# Patient Record
Sex: FEMALE | NOT HISPANIC OR LATINO | Employment: UNEMPLOYED | URBAN - METROPOLITAN AREA
[De-identification: names, ages, dates, MRNs, and addresses within clinical notes are randomized per-mention and may not be internally consistent; named-entity substitution may affect disease eponyms.]

---

## 2017-01-02 DIAGNOSIS — E03.9 HYPOTHYROIDISM, UNSPECIFIED TYPE: ICD-10-CM

## 2017-01-02 DIAGNOSIS — R79.89 LOW TSH LEVEL: ICD-10-CM

## 2017-01-13 DIAGNOSIS — E03.9 HYPOTHYROIDISM, UNSPECIFIED TYPE: ICD-10-CM

## 2017-01-13 DIAGNOSIS — R79.89 LOW TSH LEVEL: ICD-10-CM

## 2017-01-13 LAB
T3 SERPL-MCNC: 127 NG/DL (ref 60–181)
T4 FREE SERPL-MCNC: 0.98 NG/DL (ref 0.76–1.46)
TSH SERPL DL<=0.05 MIU/L-ACNC: <0.01 MU/L (ref 0.4–4)

## 2017-01-13 PROCEDURE — 84480 ASSAY TRIIODOTHYRONINE (T3): CPT

## 2017-01-13 PROCEDURE — 84439 ASSAY OF FREE THYROXINE: CPT

## 2017-01-13 PROCEDURE — 36415 COLL VENOUS BLD VENIPUNCTURE: CPT

## 2017-01-13 PROCEDURE — 84443 ASSAY THYROID STIM HORMONE: CPT

## 2017-01-14 DIAGNOSIS — E05.80 THYROTOXICOSIS, EXOGENOUS IATROGENIC: ICD-10-CM

## 2017-01-14 DIAGNOSIS — E03.9 HYPOTHYROIDISM, UNSPECIFIED TYPE: Primary | ICD-10-CM

## 2017-01-14 RX ORDER — LEVOTHYROXINE SODIUM 88 MCG
88 TABLET ORAL DAILY
Qty: 90 TABLET | Refills: 3 | Status: SHIPPED | OUTPATIENT
Start: 2017-01-14 | End: 2017-03-31

## 2017-01-18 ENCOUNTER — TELEPHONE (OUTPATIENT)
Dept: ENDOCRINOLOGY | Facility: CLINIC | Age: 42
End: 2017-01-18

## 2017-01-18 DIAGNOSIS — E03.9 HYPOTHYROIDISM, UNSPECIFIED TYPE: Primary | ICD-10-CM

## 2017-01-18 NOTE — TELEPHONE ENCOUNTER
----- Message from Amberly Aguilar RN sent at 1/17/2017  7:09 PM CST -----  Regarding: FW: labs to be entered  Contact: [phone]      ----- Message -----     From: Maria Luz Guevara     Sent: 1/17/2017   5:49 PM       To: [facility] Triage-  Subject: labs to be entered                               Pt called to schedule an appointment for 2 months out with Dr. Rodriguez, she was told in the [employer] message that there were going to be follow up labs along with this appointment but no new orders have been entered for them. Can these be entered so she can schedule her labs please?    Thank you     Maria Luz Guevara  Intake representative  Call Center

## 2017-01-24 ENCOUNTER — OFFICE VISIT (OUTPATIENT)
Dept: ORTHOPEDICS | Facility: CLINIC | Age: 42
End: 2017-01-24

## 2017-01-24 VITALS — WEIGHT: 148 LBS | BODY MASS INDEX: 27.23 KG/M2 | HEIGHT: 62 IN

## 2017-01-24 DIAGNOSIS — M25.469 EFFUSION OF LOWER LEG JOINT: Primary | ICD-10-CM

## 2017-01-24 RX ORDER — NAPROXEN 500 MG/1
500 TABLET ORAL 2 TIMES DAILY WITH MEALS
Qty: 60 TABLET | Refills: 3 | Status: SHIPPED | OUTPATIENT
Start: 2017-01-24 | End: 2017-11-20

## 2017-01-24 NOTE — PROGRESS NOTES
" Subjective:   Karishma Saucedo is a 41 year old female who complains of left posterior knee pain. No history of trauma to her left knee. No acute injury. +Theater sign and pain with first few steps after immobility. No swelling, no locking and no giveway. No anterior knee pain. She has not had to take anything for this as the pain has been mild. 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    Background:   Date of injury: None   Duration of symptoms: 3 months  Mechanism of Injury: Insidious Onset; Unknown   Aggravating factors: Standing after sitting for long periods  Relieving Factors: walking  Prior Evaluation: None    PAST MEDICAL, SOCIAL, SURGICAL AND FAMILY HISTORY: She  has a past medical history of Hypothyroidism and Dysmenorrhea.  She  has past surgical history that includes no history of surgery.  Her family history includes DIABETES in her father and mother; Hyperlipidemia in an other family member; Hypertension in her maternal uncle; Thyroid Disease in her father, maternal uncle, and another family member. There is no history of CANCER.  She reports that she has never smoked. She has never used smokeless tobacco. She reports that she drinks about 3.0 oz of alcohol per week. She reports that she does not use illicit drugs.    ALLERGIES: She is allergic to augmentin and shellfish allergy.    CURRENT MEDICATIONS: She has a current medication list which includes the following prescription(s): synthroid, norelgestromin-ethinyl estradiol, letrozole, fexofenadine hcl, vitamin d, and multiple vitamins-minerals.     REVIEW OF SYSTEMS: 3 point review of systems is negative except as noted above.     Exam:   Ht 5' 2\" (1.575 m)  Wt 148 lb (67.132 kg)  BMI 27.06 kg/m2      CONSTITUTIONIAL: healthy, alert and no distress  GAIT: normal  PSYCHIATRIC: affect normal/bright and mentation appears normal.    MUSCULOSKELETAL:   LEFT KNEE: Comprehensive knee examination demonstrates FROM, (+) effusion, " (-) medial/lateral patellar facet tenderness, (-) patellar inhibition, (-) apprehension; (-) pain or laxity with valgus stress testing in 0 or 30 degrees of knee flexion, (-) pain or laxity with varus stress testing in 0 or 30 degrees of knee flexion, (-) lachman, (-) PD; (-) medial joint line tenderness, (-) lateral joint line tenderness, (-) bounce test, (-) Baylee test.         Assessment/Plan:   (M25.805) Effusion of lower leg joint  (primary encounter diagnosis)  Comment: Will proceed with a trial of naproxen given the remaining knee exam is unremarkable, with no trauma, and only a small effusion. If effusion does not resolve will then proceed with an MRI and if that is unremarkable then consider synovial fluid aspiration. Karishma is aware of the process and plan. She will follow up if effusion does not resolve.  Plan: naproxen (NAPROSYN) 500 MG tablet

## 2017-01-24 NOTE — Clinical Note
"  1/24/2017      RE: Karishma Saucedo  [address]  [facility] [address]        Subjective:   Karishma Saucedo is a 41 year old female who complains of left posterior knee pain. No history of trauma to her left knee. No acute injury. +Theater sign and pain with first few steps after immobility. No swelling, no locking and no giveway. No anterior knee pain. She has not had to take anything for this as the pain has been mild. 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    Background:   Date of injury: None   Duration of symptoms: 3 months  Mechanism of Injury: Insidious Onset; Unknown   Aggravating factors: Standing after sitting for long periods  Relieving Factors: walking  Prior Evaluation: None    PAST MEDICAL, SOCIAL, SURGICAL AND FAMILY HISTORY: She  has a past medical history of Hypothyroidism and Dysmenorrhea.  She  has past surgical history that includes no history of surgery.  Her family history includes DIABETES in her father and mother; Hyperlipidemia in an other family member; Hypertension in her maternal uncle; Thyroid Disease in her father, maternal uncle, and another family member. There is no history of CANCER.  She reports that she has never smoked. She has never used smokeless tobacco. She reports that she drinks about 3.0 oz of alcohol per week. She reports that she does not use illicit drugs.    ALLERGIES: She is allergic to augmentin and shellfish allergy.    CURRENT MEDICATIONS: She has a current medication list which includes the following prescription(s): synthroid, norelgestromin-ethinyl estradiol, letrozole, fexofenadine hcl, vitamin d, and multiple vitamins-minerals.     REVIEW OF SYSTEMS: 3 point review of systems is negative except as noted above.     Exam:   Ht 5' 2\" (1.575 m)  Wt 148 lb (67.132 kg)  BMI 27.06 kg/m2      CONSTITUTIONIAL: healthy, alert and no distress  GAIT: normal  PSYCHIATRIC: affect normal/bright and mentation appears " normal.    MUSCULOSKELETAL:   LEFT KNEE: Comprehensive knee examination demonstrates FROM, (+) effusion, (-) medial/lateral patellar facet tenderness, (-) patellar inhibition, (-) apprehension; (-) pain or laxity with valgus stress testing in 0 or 30 degrees of knee flexion, (-) pain or laxity with varus stress testing in 0 or 30 degrees of knee flexion, (-) lachman, (-) PD; (-) medial joint line tenderness, (-) lateral joint line tenderness, (-) bounce test, (-) Baylee test.         Assessment/Plan:   (M25.469) Effusion of lower leg joint  (primary encounter diagnosis)  Comment: Will proceed with a trial of naproxen given the remaining knee exam is unremarkable, with no trauma, and only a small effusion. If effusion does not resolve will then proceed with an MRI and if that is unremarkable then consider synovial fluid aspiration. Karishma is aware of the process and plan. She will follow up if effusion does not resolve.  Plan: naproxen (NAPROSYN) 500 MG tablet           Eliot Cheng MD

## 2017-03-28 DIAGNOSIS — E03.9 HYPOTHYROIDISM, UNSPECIFIED TYPE: ICD-10-CM

## 2017-03-28 LAB
T3 SERPL-MCNC: 132 NG/DL (ref 60–181)
T4 FREE SERPL-MCNC: 0.91 NG/DL (ref 0.76–1.46)
TSH SERPL DL<=0.05 MIU/L-ACNC: <0.01 MU/L (ref 0.4–4)

## 2017-03-31 ENCOUNTER — OFFICE VISIT (OUTPATIENT)
Dept: ENDOCRINOLOGY | Facility: CLINIC | Age: 42
End: 2017-03-31

## 2017-03-31 VITALS
SYSTOLIC BLOOD PRESSURE: 127 MMHG | HEIGHT: 62 IN | DIASTOLIC BLOOD PRESSURE: 85 MMHG | BODY MASS INDEX: 27.71 KG/M2 | HEART RATE: 109 BPM | WEIGHT: 150.6 LBS

## 2017-03-31 DIAGNOSIS — E03.9 HYPOTHYROIDISM, UNSPECIFIED TYPE: ICD-10-CM

## 2017-03-31 DIAGNOSIS — E05.80 THYROTOXICOSIS, EXOGENOUS IATROGENIC: ICD-10-CM

## 2017-03-31 RX ORDER — LEVOTHYROXINE SODIUM 88 MCG
TABLET ORAL
Qty: 90 TABLET | Refills: 3 | Status: SHIPPED | OUTPATIENT
Start: 2017-03-31 | End: 2017-10-22 | Stop reason: DRUGHIGH

## 2017-03-31 RX ORDER — LEVOTHYROXINE SODIUM 88 MCG
TABLET ORAL
Qty: 90 TABLET | Refills: 3 | Status: SHIPPED | OUTPATIENT
Start: 2017-03-31 | End: 2017-03-31

## 2017-03-31 ASSESSMENT — PAIN SCALES - GENERAL: PAINLEVEL: NO PAIN (0)

## 2017-03-31 NOTE — NURSING NOTE
Chief Complaint   Patient presents with     RECHECK     F/U HYPOTHYROID     Princess Hampton, JAMES  Endocrinology & Diabetes 3G

## 2017-03-31 NOTE — PROGRESS NOTES
Endocrinology Consult Note    Attending ASSESSMENT/PLAN:     1.  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 Reduce dose to 88 x  6.5/week, divided.  Labs in 2 months.    2  Hypothyroidism - as per # 1.  I have insufficient data to confirm or refute this past diagnosis - It is possible she never had hypothyroidism.     Lima Rodriguez MD      Cc/  HISTORY OF PRESENT ILLNESS Karishma presents todayfor follow up of hypothyroidism.      She initially presented here on Synthroid 112 mcg/day + cytomel 12.5 mcg bid.  Since our lst meeting we stopped the cytomel.   She was last seen by me 7/15 at which time she was on 112 x 6.5 doses/week, divided.  We reduced the dose to Synthroid 100 mcg/day.  There have been a number of mychart dose changes since then.  ON 10/5/15 we reduced to 100  X 6.5/week, divided.  After the 1/17 labs we reduced to Synthroid 88 mcg/day.  She most recently had labs on 3/28/17, TSH < 0.01, free T4 0.91, T3 132.      REVIEW OF SYSTEMS  Weight gain 15# in the last year  Energy in the morning, tired in the afternoon  Bedtime 8-10 PM   No hot flashes  Cardiac: negative  She believes letrozole is for endometriosis  Her is occasionally having bleeding on OCP patch    Past Medical History  Past Medical History:   Diagnosis Date     Dysmenorrhea      Hypothyroidism      Medications  Current Outpatient Prescriptions   Medication Sig Dispense Refill     SYNTHROID 88 MCG tablet Monday-Saturday: (1 tablet/day);   Sunday: (0.5 tablet) 90 tablet 3     naproxen (NAPROSYN) 500 MG tablet Take 1 tablet (500 mg) by mouth 2 times daily (with meals) 60 tablet 3     norelgestromin-ethinyl estradiol (ORTHO EVRA) 150-35 MCG/24HR patch Place 1 patch onto the skin once a week 12 patch 4     letrozole (FEMARA) 2.5 MG tablet Take 1 tablet (2.5 mg) by mouth daily 90 tablet 3     Fexofenadine HCl (ALLEGRA PO) Take 30 mg by mouth daily       Cholecalciferol (VITAMIN D) 2000 UNITS CAPS Take 2 tablets by mouth daily    "    Multiple Vitamins-Minerals (MULTIVITAL PO) Take by mouth daily       [DISCONTINUED] SYNTHROID 88 MCG tablet Monday-Saturday: (1 tablet/day);   Sunday: (0.5 tablet) 90 tablet 3     [DISCONTINUED] SYNTHROID 88 MCG tablet Take 1 tablet (88 mcg) by mouth daily 90 tablet 3       Allergies  Allergies   Allergen Reactions     Augmentin Nausea and Vomiting     Shellfish Allergy Hives       Family History  family history includes DIABETES in her father and mother; Hyperlipidemia in an other family member; Hypertension in her maternal uncle; Other Cancer in her father; Thyroid Disease in her father, maternal uncle, and another family member. There is no history of CANCER.    Social History  Social History   Substance Use Topics     Smoking status: Never Smoker     Smokeless tobacco: Never Used     Alcohol use 3.0 oz/week      Comment: 1-2 drinks 3 days/week     ;     Physical Exam  /85  Pulse 109  Ht 1.575 m (5' 2\")  Wt 68.3 kg (150 lb 9.6 oz)  BMI 27.55 kg/m2  Body mass index is 27.55 kg/(m^2).  GENERAL : pleasant young woman  In no apparent distress.She is alone  SKIN: Normal color, normal temperature, texture.    EYES: PER, No scleral icterus,  No proptosis, conjunctival redness, stare, retraction  NECK: No visible masses. No palpable adenopathy, or masses.  THYROID:  Not palpable  RESP: Lungs clear to auscultation bilaterally  CARDIAC: Regular rate and rhythm, normal S1 S2, without murmurs, rubs or gallops       NEURO: awake, alert, responds appropriately to questions..  Moves all extremities; Gait normal.  No tremor of the outstretched hand.  DTRs  2 /4 ,   EXTREMITIES: No clubbing, cyanosis or edema.    DATA REVEIW    ENDO THYROID LABS-[facility] Latest Ref Rng & Units 3/28/2017 1/13/2017   TSH 0.40 - 4.00 mU/L <0.01 (L) <0.01 (L)   T4 FREE 0.76 - 1.46 ng/dL 0.91 0.98   TRIIODOTHYRONINE(T3) 60 - 181 ng/dL 132 127     ENDO THYROID LABS-[facility] Latest Ref Rng & Units 12/23/2016 3/4/2016   TSH 0.40 - 4.00 mU/L " <0.01 (L) <0.01 (L)   T4 FREE 0.76 - 1.46 ng/dL 1.04 0.95   TRIIODOTHYRONINE(T3) 60 - 181 ng/dL 139      ENDO THYROID LABS-[facility] Latest Ref Rng & Units 10/2/2015   TSH 0.40 - 4.00 mU/L <0.01 (L)   T4 FREE 0.76 - 1.46 ng/dL 1.01   TRIIODOTHYRONINE(T3) 60 - 181 ng/dL 106

## 2017-03-31 NOTE — PATIENT INSTRUCTIONS
Reduce the Synthroid from 88 mcg/day to the following schedule:  Monday-Saturday: (1 tablet/day);   Sunday: (0.5 tablet)  You should have labs to follow up on the change in about 2 months.  I am leaving orders for follow up labs on the medical record.  Please call [phone]  to schedule lab follow up testing as directed.  Alternatively, it can be drawn in any [place] lab.

## 2017-03-31 NOTE — MR AVS SNAPSHOT
After Visit Summary   3/31/2017    Karishma Saucedo    MRN: [id number]           Patient Information     Date Of Birth          1975        Visit Information        Provider Department      3/31/2017 12:30 PM Lima Rodriguez MD [facility]        Today's Diagnoses     Hypothyroidism, unspecified type        Thyrotoxicosis, exogenous iatrogenic          Care Instructions    Reduce the Synthroid from 88 mcg/day to the following schedule:  Monday-Saturday: (1 tablet/day);   Sunday: (0.5 tablet)  You should have labs to follow up on the change in about 2 months.  I am leaving orders for follow up labs on the medical record.  Please call [phone]  to schedule lab follow up testing as directed.  Alternatively, it can be drawn in any [place] lab.            Follow-ups after your visit        Follow-up notes from your care team     Return in about 6 months (around 9/30/2017).      Future tests that were ordered for you today     Open Future Orders        Priority Expected Expires Ordered    T4 free Routine 5/31/2017 3/31/2018 3/31/2017    T3 total Routine 5/31/2017 3/31/2018 3/31/2017    TSH Routine 5/31/2017 3/31/2018 3/31/2017            Who to contact     Please call your clinic at [phone] to:    Ask questions about your health    Make or cancel appointments    Discuss your medicines    Learn about your test results    Speak to your doctor   If you have compliments or concerns about an experience at your clinic, or if you wish to file a complaint, please contact [facility] Patient Relations at [phone] or email us at [email or web address]         Additional Information About Your Visit        MyChart Information     [employer] gives you secure access to your electronic health record. If you see a primary care provider, you can also send messages to your care team and make appointments. If you have questions, please call your primary care  "clinic.  If you do not have a primary care provider, please call [phone] and they will assist you.      [employer] is an electronic gateway that provides easy, online access to your medical records. With [employer], you can request a clinic appointment, read your test results, renew a prescription or communicate with your care team.     To access your existing account, please contact your [facility] or call [phone] for assistance.        Care EveryWhere ID     This is your Care EveryWhere ID. This could be used by other organizations to access your [place] medical records  [id number]        Your Vitals Were     Pulse Height BMI (Body Mass Index)             109 1.575 m (5' 2\") 27.55 kg/m2          Blood Pressure from Last 3 Encounters:   03/31/17 127/85   05/13/16 119/76   01/27/16 110/78    Weight from Last 3 Encounters:   03/31/17 68.3 kg (150 lb 9.6 oz)   01/24/17 67.1 kg (148 lb)   05/13/16 66.6 kg (146 lb 12.8 oz)                 Today's Medication Changes          These changes are accurate as of: 3/31/17  3:27 PM.  If you have any questions, ask your nurse or doctor.               Start taking these medicines.        Dose/Directions    SYNTHROID 88 MCG tablet   Used for:  Hypothyroidism, unspecified type, Thyrotoxicosis, exogenous iatrogenic   Generic drug:  levothyroxine   Started by:  Lima Rodriguez MD        Monday-Saturday: (1 tablet/day);  Sunday: (0.5 tablet)   Quantity:  90 tablet   Refills:  3            Where to get your medicines      Some of these will need a paper prescription and others can be bought over the counter.  Ask your nurse if you have questions.     Bring a paper prescription for each of these medications     SYNTHROID 88 MCG tablet                Primary Care Provider Office Phone # Fax #    Meche Cast [phone]       [address]  [facility] [address]        Thank you!     Thank you for " choosing [facility] ENDOCRINOLOG[facility]  for your care. Our goal is always to provide you with excellent care. Hearing back from our patients is one way we can continue to improve our services. Please take a few minutes to complete the written survey that you may receive in the mail after your visit with us. Thank you!             Your Updated Medication List - Protect others around you: Learn how to safely use, store and throw away your medicines at www.disposemymeds.org.          This list is accurate as of: 3/31/17  3:27 PM.  Always use your most recent med list.                   Brand Name Dispense Instructions for use    ALLEGRA PO      Take 30 mg by mouth daily       letrozole 2.5 MG tablet    FEMARA    90 tablet    Take 1 tablet (2.5 mg) by mouth daily       MULTIVITAL PO      Take by mouth daily       naproxen 500 MG tablet    NAPROSYN    60 tablet    Take 1 tablet (500 mg) by mouth 2 times daily (with meals)       norelgestromin-ethinyl estradiol 150-35 MCG/24HR patch    ORTHO EVRA    12 patch    Place 1 patch onto the skin once a week       SYNTHROID 88 MCG tablet   Generic drug:  levothyroxine     90 tablet    Monday-Saturday: (1 tablet/day);  Sunday: (0.5 tablet)       vitamin D 2000 UNITS Caps      Take 2 tablets by mouth daily

## 2017-03-31 NOTE — LETTER
3/31/2017       RE: Karishma Saucedo  [address]  [facility] [address]     Dear Colleague,    Thank you for referring your patient, Karishma Saucedo, to the [facility] ENDOCRINOLOGY at [facility]. Please see a copy of my visit note below.         DIABETES AND ENDOCRINOLOGY CLINIC    Karishma Saucedo  41 years/female  MRN: [id number]    Reason for visit: follow up of hypothyroidism and iatrogenic thyrotoxicosis.    HPI:  Karishma Saucedo is a 41 year old female with hypothyroidism with iatrogenic thyrotoxicosis.   At her first visit, she was on Synthroid 112 mcg/day + Cytomel 12.5 mcg BID. Cytomel was discontinued at this visit.   In 6/2014, her TSH was 9.09. She was on L-T4 112mcg 8 doses/week.  In 8/2014, TSH was suppressed but FT4 and T3 was normal. L-T4 was decreased to 112 mcg 7.5doses/week.  In 10/2014, L-T4 was further decreased to 112mcg/day, as her TSH was <0.01, FT4 - 0.88, T3-85.   1/2015 - TSH < 0.01, FT4 - 1.09. L-T4 was changed to 112mcg 6.5doses/day.   3/2015: TSH <0.01, FT4- 0.97  7/2015: FT4- 1, TSH <0.01, T3 -109.  She was changed to synthroid 100mcg/day.   Since then she has been on follow up through [facility] and is on Synthroid 88mcg/day since January.   She complains of a 15 pound weight gain over the past year. She has occasional tremors which she says are familiar. She attributes her dry skin to the weather in [place]. She complains of occasional edema on prolonged standing or long flights.        Past Medical History:   Diagnosis Date     Dysmenorrhea      Hypothyroidism      Past Surgical History:   Procedure Laterality Date     NO HISTORY OF SURGERY       Family History   Problem Relation Age of Onset     Thyroid Disease Maternal Uncle      DIABETES Mother      Hypertension Maternal Uncle      CANCER No family hx of      DIABETES Father      Thyroid Disease       Hyperlipidemia       Thyroid Disease Father      Social history:   She drinks  "around 2 glasses of wine/ week. She has never been a smoker.      Current Outpatient Prescriptions   Medication     naproxen (NAPROSYN) 500 MG tablet     SYNTHROID 88 MCG tablet     norelgestromin-ethinyl estradiol (ORTHO EVRA) 150-35 MCG/24HR patch     letrozole (FEMARA) 2.5 MG tablet     Fexofenadine HCl (ALLEGRA PO)     Cholecalciferol (VITAMIN D) 2000 UNITS CAPS     Multiple Vitamins-Minerals (MULTIVITAL PO)     No current facility-administered medications for this visit.          Physical examination:   Vitals: /85  Pulse 109  Ht 1.575 m (5' 2\")  Wt 68.3 kg (150 lb 9.6 oz)  BMI 27.55 kg/m2  General: she appears healthy and comfortable  HEENT: No thyroid eye signs. Thyroid gland was not palpable.   Cardiac: S1S2+. No murmur/added sounds.  Respiratory: normal breath sounds in all lung fields  Abdomen: soft and non tender. No organomegaly.  Extremities: no tremor on examination. No edema. Reflexes normal.      Orders Only on 03/28/2017   Component Date Value Ref Range Status     T4 Free 03/28/2017 0.91  0.76 - 1.46 ng/dL Final     Triiodothyronine (T3) 03/28/2017 132  60 - 181 ng/dL Final     TSH 03/28/2017 <0.01* 0.40 - 4.00 mU/L Final         Assessment and Plan:     1. Hypothyroidism and iatrogenic thyrotoxicosis:   Karishma Saucedo is a 41 year old female with hypothyroidism with iatrogenic thyrotoxicosis. Since her first visit, her labs have shown iatrogenic thyrotoxicosis. She is asymptomatic. She doesn't feel different with medication changes.   Her most recent TFTs show hyperthyroidism, and she is on Synthroid 88mcg/day.      Recommendations:   - Snythroid to be reduced to 88mcg 6.5doses/week  - repeat TFT in 2 months.  - communicate with us through [employer] if there are any changes.           Annette Samayoa  Medical student.    Endocrinology Consult Note    Attending ASSESSMENT/PLAN:     1.  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 Reduce dose to 88 x  6.5/week, divided.  Labs in 2 " months.    2  Hypothyroidism - as per # 1.  I have insufficient data to confirm or refute this past diagnosis - It is possible she never had hypothyroidism.     Lima Rodriguez MD      Cc/  HISTORY OF PRESENT ILLNESS Karishma presents todayfor follow up of hypothyroidism.      She initially presented here on Synthroid 112 mcg/day + cytomel 12.5 mcg bid.  Since our lst meeting we stopped the cytomel.   She was last seen by me 7/15 at which time she was on 112 x 6.5 doses/week, divided.  We reduced the dose to Synthroid 100 mcg/day.  There have been a number of mychart dose changes since then.  ON 10/5/15 we reduced to 100  X 6.5/week, divided.  After the 1/17 labs we reduced to Synthroid 88 mcg/day.  She most recently had labs on 3/28/17, TSH < 0.01, free T4 0.91, T3 132.      REVIEW OF SYSTEMS  Weight gain 15# in the last year  Energy in the morning, tired in the afternoon  Bedtime 8-10 PM   No hot flashes  Cardiac: negative  She believes letrozole is for endometriosis  Her is occasionally having bleeding on OCP patch    Past Medical History  Past Medical History:   Diagnosis Date     Dysmenorrhea      Hypothyroidism      Medications  Current Outpatient Prescriptions   Medication Sig Dispense Refill     SYNTHROID 88 MCG tablet Monday-Saturday: (1 tablet/day);   Sunday: (0.5 tablet) 90 tablet 3     naproxen (NAPROSYN) 500 MG tablet Take 1 tablet (500 mg) by mouth 2 times daily (with meals) 60 tablet 3     norelgestromin-ethinyl estradiol (ORTHO EVRA) 150-35 MCG/24HR patch Place 1 patch onto the skin once a week 12 patch 4     letrozole (FEMARA) 2.5 MG tablet Take 1 tablet (2.5 mg) by mouth daily 90 tablet 3     Fexofenadine HCl (ALLEGRA PO) Take 30 mg by mouth daily       Cholecalciferol (VITAMIN D) 2000 UNITS CAPS Take 2 tablets by mouth daily       Multiple Vitamins-Minerals (MULTIVITAL PO) Take by mouth daily       [DISCONTINUED] SYNTHROID 88 MCG tablet Monday-Saturday: (1 tablet/day);   Sunday: (0.5 tablet) 90  "tablet 3     [DISCONTINUED] SYNTHROID 88 MCG tablet Take 1 tablet (88 mcg) by mouth daily 90 tablet 3       Allergies  Allergies   Allergen Reactions     Augmentin Nausea and Vomiting     Shellfish Allergy Hives       Family History  family history includes DIABETES in her father and mother; Hyperlipidemia in an other family member; Hypertension in her maternal uncle; Other Cancer in her father; Thyroid Disease in her father, maternal uncle, and another family member. There is no history of CANCER.    Social History  Social History   Substance Use Topics     Smoking status: Never Smoker     Smokeless tobacco: Never Used     Alcohol use 3.0 oz/week      Comment: 1-2 drinks 3 days/week     ;     Physical Exam  /85  Pulse 109  Ht 1.575 m (5' 2\")  Wt 68.3 kg (150 lb 9.6 oz)  BMI 27.55 kg/m2  Body mass index is 27.55 kg/(m^2).  GENERAL : pleasant young woman  In no apparent distress.She is alone  SKIN: Normal color, normal temperature, texture.    EYES: PER, No scleral icterus,  No proptosis, conjunctival redness, stare, retraction  NECK: No visible masses. No palpable adenopathy, or masses.  THYROID:  Not palpable  RESP: Lungs clear to auscultation bilaterally  CARDIAC: Regular rate and rhythm, normal S1 S2, without murmurs, rubs or gallops       NEURO: awake, alert, responds appropriately to questions..  Moves all extremities; Gait normal.  No tremor of the outstretched hand.  DTRs  2 /4 ,   EXTREMITIES: No clubbing, cyanosis or edema.    DATA REVEIW    ENDO THYROID LABS-[facility] Latest Ref Rng & Units 3/28/2017 1/13/2017   TSH 0.40 - 4.00 mU/L <0.01 (L) <0.01 (L)   T4 FREE 0.76 - 1.46 ng/dL 0.91 0.98   TRIIODOTHYRONINE(T3) 60 - 181 ng/dL 132 127     ENDO THYROID LABS-[facility] Latest Ref Rng & Units 12/23/2016 3/4/2016   TSH 0.40 - 4.00 mU/L <0.01 (L) <0.01 (L)   T4 FREE 0.76 - 1.46 ng/dL 1.04 0.95   TRIIODOTHYRONINE(T3) 60 - 181 ng/dL 139      ENDO THYROID LABS-[facility] Latest Ref Rng & Units 10/2/2015   TSH 0.40 - " 4.00 mU/L <0.01 (L)   T4 FREE 0.76 - 1.46 ng/dL 1.01   TRIIODOTHYRONINE(T3) 60 - 181 ng/dL 106       Again, thank you for allowing me to participate in the care of your patient.      Sincerely,    Lima Rodriguez MD

## 2017-03-31 NOTE — PROGRESS NOTES
DIABETES AND ENDOCRINOLOGY CLINIC    Karishma Saucedo  41 years/female  MRN: [id number]    Reason for visit: follow up of hypothyroidism and iatrogenic thyrotoxicosis.    HPI:  Karishma Sauceod is a 41 year old female with hypothyroidism with iatrogenic thyrotoxicosis.   At her first visit, she was on Synthroid 112 mcg/day + Cytomel 12.5 mcg BID. Cytomel was discontinued at this visit.   In 6/2014, her TSH was 9.09. She was on L-T4 112mcg 8 doses/week.  In 8/2014, TSH was suppressed but FT4 and T3 was normal. L-T4 was decreased to 112 mcg 7.5doses/week.  In 10/2014, L-T4 was further decreased to 112mcg/day, as her TSH was <0.01, FT4 - 0.88, T3-85.   1/2015 - TSH < 0.01, FT4 - 1.09. L-T4 was changed to 112mcg 6.5doses/day.   3/2015: TSH <0.01, FT4- 0.97  7/2015: FT4- 1, TSH <0.01, T3 -109.  She was changed to synthroid 100mcg/day.   Since then she has been on follow up through [facility] and is on Synthroid 88mcg/day since January.   She complains of a 15 pound weight gain over the past year. She has occasional tremors which she says are familiar. She attributes her dry skin to the weather in [place]. She complains of occasional edema on prolonged standing or long flights.        Past Medical History:   Diagnosis Date     Dysmenorrhea      Hypothyroidism      Past Surgical History:   Procedure Laterality Date     NO HISTORY OF SURGERY       Family History   Problem Relation Age of Onset     Thyroid Disease Maternal Uncle      DIABETES Mother      Hypertension Maternal Uncle      CANCER No family hx of      DIABETES Father      Thyroid Disease       Hyperlipidemia       Thyroid Disease Father      Social history:   She drinks around 2 glasses of wine/ week. She has never been a smoker.      Current Outpatient Prescriptions   Medication     naproxen (NAPROSYN) 500 MG tablet     SYNTHROID 88 MCG tablet     norelgestromin-ethinyl estradiol (ORTHO EVRA) 150-35 MCG/24HR patch     letrozole (FEMARA) 2.5 MG tablet      "Fexofenadine HCl (ALLEGRA PO)     Cholecalciferol (VITAMIN D) 2000 UNITS CAPS     Multiple Vitamins-Minerals (MULTIVITAL PO)     No current facility-administered medications for this visit.          Physical examination:   Vitals: /85  Pulse 109  Ht 1.575 m (5' 2\")  Wt 68.3 kg (150 lb 9.6 oz)  BMI 27.55 kg/m2  General: she appears healthy and comfortable  HEENT: No thyroid eye signs. Thyroid gland was not palpable.   Cardiac: S1S2+. No murmur/added sounds.  Respiratory: normal breath sounds in all lung fields  Abdomen: soft and non tender. No organomegaly.  Extremities: no tremor on examination. No edema. Reflexes normal.      Orders Only on 03/28/2017   Component Date Value Ref Range Status     T4 Free 03/28/2017 0.91  0.76 - 1.46 ng/dL Final     Triiodothyronine (T3) 03/28/2017 132  60 - 181 ng/dL Final     TSH 03/28/2017 <0.01* 0.40 - 4.00 mU/L Final         Assessment and Plan:     1. Hypothyroidism and iatrogenic thyrotoxicosis:   Karishma Saucedo is a 41 year old female with hypothyroidism with iatrogenic thyrotoxicosis. Since her first visit, her labs have shown iatrogenic thyrotoxicosis. She is asymptomatic. She doesn't feel different with medication changes.   Her most recent TFTs show hyperthyroidism, and she is on Synthroid 88mcg/day.      Recommendations:   - Snythroid to be reduced to 88mcg 6.5doses/week  - repeat TFT in 2 months.  - communicate with us through [employer] if there are any changes.           Annette Samayoa  Medical student.  "

## 2017-04-07 ENCOUNTER — TELEPHONE (OUTPATIENT)
Dept: OBGYN | Facility: CLINIC | Age: 42
End: 2017-04-07

## 2017-04-07 ENCOUNTER — MYC MEDICAL ADVICE (OUTPATIENT)
Dept: OBGYN | Facility: CLINIC | Age: 42
End: 2017-04-07

## 2017-04-07 DIAGNOSIS — N80.9 ENDOMETRIOSIS: ICD-10-CM

## 2017-04-07 RX ORDER — NORELGESTROMIN AND ETHINYL ESTRADIOL 35; 150 UG/MG; UG/MG
1 PATCH TRANSDERMAL WEEKLY
Qty: 12 PATCH | Refills: 0 | Status: SHIPPED | OUTPATIENT
Start: 2017-04-07 | End: 2017-08-08

## 2017-04-07 RX ORDER — LETROZOLE 2.5 MG/1
2.5 TABLET, FILM COATED ORAL DAILY
Qty: 90 TABLET | Refills: 0 | Status: SHIPPED | OUTPATIENT
Start: 2017-04-07 | End: 2017-04-07

## 2017-04-07 RX ORDER — LETROZOLE 2.5 MG/1
2.5 TABLET, FILM COATED ORAL DAILY
Qty: 90 TABLET | Refills: 0 | Status: SHIPPED | OUTPATIENT
Start: 2017-04-07 | End: 2017-07-12

## 2017-04-07 NOTE — TELEPHONE ENCOUNTER
Refill request received for OCP . Her last clinic appointment was 5/13/16. Refill completed per Mandy RN protocol note place on refill that an appointment was needed for further refills.

## 2017-04-07 NOTE — TELEPHONE ENCOUNTER
Received call from Moe, at [facility], stating that pt was there to pick-up refill of OCP patch but she would like pills instead.    Review of EPIC shows OCP pills were not effective in the past.     Spoke with Karishma who clarified that she needs the letrozole pill that she takes along with the patch. Nurse appreciated clarification and sent refill to cover until next annual exam due in May. Also adivsed of need for annual exam and she reports her father is very ill and she will schedule appointment when she knows more of his status. Nurse agreed with plan.

## 2017-07-06 ENCOUNTER — MYC MEDICAL ADVICE (OUTPATIENT)
Dept: ENDOCRINOLOGY | Facility: CLINIC | Age: 42
End: 2017-07-06

## 2017-07-06 DIAGNOSIS — E05.80 THYROTOXICOSIS, EXOGENOUS IATROGENIC: ICD-10-CM

## 2017-07-06 DIAGNOSIS — E03.9 HYPOTHYROIDISM, UNSPECIFIED TYPE: ICD-10-CM

## 2017-07-06 LAB
T3 SERPL-MCNC: 115 NG/DL (ref 60–181)
T4 FREE SERPL-MCNC: 0.94 NG/DL (ref 0.76–1.46)
TSH SERPL DL<=0.05 MIU/L-ACNC: <0.01 MU/L (ref 0.4–4)

## 2017-07-12 ENCOUNTER — MYC MEDICAL ADVICE (OUTPATIENT)
Dept: OBGYN | Facility: CLINIC | Age: 42
End: 2017-07-12

## 2017-07-12 DIAGNOSIS — N80.9 ENDOMETRIOSIS: ICD-10-CM

## 2017-07-12 RX ORDER — LETROZOLE 2.5 MG/1
2.5 TABLET, FILM COATED ORAL DAILY
Qty: 90 TABLET | Refills: 3 | Status: SHIPPED | OUTPATIENT
Start: 2017-07-12 | End: 2017-10-20

## 2017-08-08 ENCOUNTER — TELEPHONE (OUTPATIENT)
Dept: OBGYN | Facility: CLINIC | Age: 42
End: 2017-08-08

## 2017-08-08 DIAGNOSIS — N80.9 ENDOMETRIOSIS: ICD-10-CM

## 2017-08-08 RX ORDER — NORELGESTROMIN AND ETHINYL ESTRADIOL 35; 150 UG/MG; UG/MG
1 PATCH TRANSDERMAL WEEKLY
Qty: 12 PATCH | Refills: 0 | Status: SHIPPED | OUTPATIENT
Start: 2017-08-08 | End: 2017-10-20

## 2017-08-08 NOTE — TELEPHONE ENCOUNTER
"----- Message from Heena Presley sent at 8/8/2017  8:55 AM CDT -----  Regarding: Xulane Rx  Contact: [phone]  Patient has one week left of her medication, Xulane patch. She called & scheduled her annual for 10/20/17 with Dr. Pearson. She is looking for a \"bridge\" of the medication to get her through until she sees Dr. Pearson.     Thank you!  Jemma    Call Center       Please DO NOT send this message and/or reply back to sender. Call Center Representatives DO NOT respond to messages.     "

## 2017-10-09 DIAGNOSIS — E05.80 THYROTOXICOSIS, EXOGENOUS IATROGENIC: Primary | ICD-10-CM

## 2017-10-18 ASSESSMENT — ANXIETY QUESTIONNAIRES
4. TROUBLE RELAXING: NOT AT ALL
GAD7 TOTAL SCORE: 0
7. FEELING AFRAID AS IF SOMETHING AWFUL MIGHT HAPPEN: NOT AT ALL
2. NOT BEING ABLE TO STOP OR CONTROL WORRYING: NOT AT ALL
5. BEING SO RESTLESS THAT IT IS HARD TO SIT STILL: NOT AT ALL
GAD7 TOTAL SCORE: 0
6. BECOMING EASILY ANNOYED OR IRRITABLE: NOT AT ALL
3. WORRYING TOO MUCH ABOUT DIFFERENT THINGS: NOT AT ALL
GAD7 TOTAL SCORE: 0
7. FEELING AFRAID AS IF SOMETHING AWFUL MIGHT HAPPEN: NOT AT ALL
1. FEELING NERVOUS, ANXIOUS, OR ON EDGE: NOT AT ALL

## 2017-10-18 ASSESSMENT — ENCOUNTER SYMPTOMS: BREAST PAIN: 1

## 2017-10-19 ASSESSMENT — ANXIETY QUESTIONNAIRES: GAD7 TOTAL SCORE: 0

## 2017-10-20 ENCOUNTER — OFFICE VISIT (OUTPATIENT)
Dept: OBGYN | Facility: CLINIC | Age: 42
End: 2017-10-20
Attending: OBSTETRICS & GYNECOLOGY
Payer: COMMERCIAL

## 2017-10-20 VITALS
SYSTOLIC BLOOD PRESSURE: 113 MMHG | HEIGHT: 62 IN | DIASTOLIC BLOOD PRESSURE: 70 MMHG | BODY MASS INDEX: 28.52 KG/M2 | HEART RATE: 96 BPM | WEIGHT: 155 LBS

## 2017-10-20 DIAGNOSIS — E05.80 THYROTOXICOSIS, EXOGENOUS IATROGENIC: ICD-10-CM

## 2017-10-20 DIAGNOSIS — Z20.2 STD EXPOSURE: ICD-10-CM

## 2017-10-20 DIAGNOSIS — Z00.00 VISIT FOR PREVENTIVE HEALTH EXAMINATION: ICD-10-CM

## 2017-10-20 DIAGNOSIS — Z12.31 ENCOUNTER FOR SCREENING MAMMOGRAM FOR BREAST CANCER: ICD-10-CM

## 2017-10-20 DIAGNOSIS — N80.9 ENDOMETRIOSIS: ICD-10-CM

## 2017-10-20 DIAGNOSIS — Z83.3 FAMILY HISTORY OF DIABETES MELLITUS: Primary | ICD-10-CM

## 2017-10-20 LAB
T3 SERPL-MCNC: 98 NG/DL (ref 60–181)
T4 FREE SERPL-MCNC: 0.92 NG/DL (ref 0.76–1.46)
TSH SERPL DL<=0.005 MIU/L-ACNC: <0.01 MU/L (ref 0.4–4)

## 2017-10-20 PROCEDURE — 84480 ASSAY TRIIODOTHYRONINE (T3): CPT

## 2017-10-20 PROCEDURE — 87591 N.GONORRHOEAE DNA AMP PROB: CPT | Performed by: OBSTETRICS & GYNECOLOGY

## 2017-10-20 PROCEDURE — 36415 COLL VENOUS BLD VENIPUNCTURE: CPT

## 2017-10-20 PROCEDURE — 99212 OFFICE O/P EST SF 10 MIN: CPT | Mod: ZF

## 2017-10-20 PROCEDURE — 84439 ASSAY OF FREE THYROXINE: CPT

## 2017-10-20 PROCEDURE — 87491 CHLMYD TRACH DNA AMP PROBE: CPT | Performed by: OBSTETRICS & GYNECOLOGY

## 2017-10-20 PROCEDURE — 84443 ASSAY THYROID STIM HORMONE: CPT

## 2017-10-20 RX ORDER — LETROZOLE 2.5 MG/1
2.5 TABLET, FILM COATED ORAL DAILY
Qty: 90 TABLET | Refills: 3 | Status: SHIPPED | OUTPATIENT
Start: 2017-10-20 | End: 2019-01-08

## 2017-10-20 RX ORDER — NORELGESTROMIN AND ETHINYL ESTRADIOL 35; 150 UG/MG; UG/MG
1 PATCH TRANSDERMAL WEEKLY
Qty: 12 PATCH | Refills: 0 | Status: SHIPPED | OUTPATIENT
Start: 2017-10-20 | End: 2018-01-10

## 2017-10-20 NOTE — MR AVS SNAPSHOT
After Visit Summary   10/20/2017    Karishma Saucedo    MRN: [id number]           Patient Information     Date Of Birth          1975        Visit Information        Provider Department      10/20/2017 9:00 AM Hoda Pearson MD [facility]        Today's Diagnoses     Family history of diabetes mellitus    -  1    STD exposure        Encounter for screening mammogram for breast cancer        Endometriosis        Visit for preventive health examination          Care Instructions      PREVENTIVE HEALTH RECOMMENDATIONS:   Most women need a yearly breast and pelvic exam.    A PAP screen, a test done DURING a pelvic exam, is NO longer recommended yearly.    March 2013, screening guidelines recommended by ACOG for PAP screen are:    1) First pap at age 21.    2) Pap every 3 years until age 30.    3) After age 30, pap every 3 years or Pap with HR HPV screen every 5 years until age 65.  4) Women do NOT need a vaginal Pap screen after a hysterectomy (surgical removal of the uterus) when they have not had cancer.    Exceptions:  1) Yearly pap if HIV+ or immunosuppressed secondary to organ transplant  2) ISABEL II-III continue routine screening for 20 years.    I encourage you continue looking for opportunities to choose a healthy lifestyle:       * Choose to eat a heart healthy diet. Check out the FOOD PLATE guidelines at: http://www.choosemyplate.gov/ for helpful hints on weight and cholesterol management.  Balance your caloric intake with exercise to maintain a BMI in the 22 to 26 range. For bone health: Eat calcium-rich foods like yogurt, broccoli or take chewable calcium pills (500 to 600 mg) twice a day with food.       * Exercise for at least an average of 30 minutes a day, 5 days of the week. This will help you control your weight, release stress, and help prevent disease.      * Take a Vitamin D3 supplement daily fall through spring and during summer unless you [id number]' full  body sun exposure to skin without sunscreen.      * DO wear sunscreen to prevent skin cancer after the first 15-30 minutes.      * Identify stressors in your life, find ways to release the stress, and, make time for yourself. PLEASE ask for help if mood changes last longer than two weeks.     * Limit alcohol to one drink per day.  No smoking.  Avoid second hand smoke. If you smoke, ask for help to stop.       *  If you are in a sexual relationship, talk with your partner about possible infection risks and take action to protect yourself from exposure to a sexual infection.    Please request an infection screen for STIs (sexually transmitted infections) if you are less than age 26 OR believe that you may be at risk.     Get a flu shot each year. Get a tetanus shot every 10 years. EVERYONE needs a pertussis (Whooping cough) booster.    See your dentist twice a year for an exam and preventive care cleaning.     Consider the following screen tests:    1) cholesterol test every 5 years.     2) yearly mammogram after age 40 unless you have identified risks.    3) colonoscopy every 10 years after age 50 unless you have identified risks.    4) diabetes blood test screening if you are at risk for diabetes.      Additional information that you may also find helpful:  The Internet now gives us access to LOTS of information -- some of it helpful, research documented and also plenty of harmful, anecdotal information that may not pertain to your situtaion. Consider visiting the following websites for accurate health information:    www.vitamindcouncil.org/ : Info and ongoing research re Vitamin D    www.fairview.org : Up to date and easily searchable information on multiple topics.    www.medlineplus.gov : medication info, interactive tutorials, watch real surgeries online    www.cdc.gov : public health info, travel advisories, epidemics (H1N1)    www.danie/std.org: current research re diagnosis, treatment and prevention of  sexually contacted infections.    www.health.state.[place] : [place] dept of heat, public health issues in [place], N1N1    www.familydoctor.org : good info from the Academy of Family Physicians                Follow-ups after your visit        Follow-up notes from your care team     Return in 1 year (on 10/20/2018) for Preventative Health Visit.      Your next 10 appointments already scheduled     Oct 24, 2017  3:30 PM CDT   MA SCREENING DIGITAL BILATERAL with [id number]   [facility] ([facility])    [address] [place] [address], [address]  [facility] [address] [phone]           Do not use any powder, lotion or deodorant under your arms or on your breast. If you do, we will ask you to remove it before your exam.  Wear comfortable, two-piece clothing.  If you have any allergies, tell your care team.  Bring any previous mammograms from other facilities or have them mailed to the breast center.            Nov 20, 2017  7:30 AM CST   (Arrive by 7:15 AM)   PHYSICAL with Meche Cast MD   [facility] ([facility])    [address]  4th Floor  [facility] [address] [phone]              Future tests that were ordered for you today     Open Future Orders        Priority Expected Expires Ordered    Mammo Screening digital (bilateral) Routine  10/20/2018 10/20/2017    Lipid panel Routine  10/20/2018 10/20/2017    Glucose Routine  10/20/2018 10/20/2017    Hemoglobin A1c Routine  10/20/2018 10/20/2017    25- OH-Vitamin D Routine  10/20/2018 10/20/2017            Who to contact     Please call your clinic at [phone] to:    Ask questions about your health    Make or cancel appointments    Discuss your medicines    Learn about your test results    Speak to your doctor   If you have compliments or concerns about an experience at your clinic, or if you wish to file a complaint, please contact [facility] Patient Relations at  "[phone] or email us at [email or web address]         Additional Information About Your Visit        [facility] Information     [facility] gives you secure access to your electronic health record. If you see a primary care provider, you can also send messages to your care team and make appointments. If you have questions, please call your primary care clinic.  If you do not have a primary care provider, please call [phone] and they will assist you.      [facility] is an electronic gateway that provides easy, online access to your medical records. With [facility], you can request a clinic appointment, read your test results, renew a prescription or communicate with your care team.     To access your existing account, please contact your [facility] or call [phone] for assistance.        Care EveryWhere ID     This is your Care EveryWhere ID. This could be used by other organizations to access your [place] medical records  [id number]        Your Vitals Were     Pulse Height Last Period BMI (Body Mass Index)          96 1.575 m (5' 2\") 09/14/2017 (Approximate) 28.35 kg/m2         Blood Pressure from Last 3 Encounters:   10/20/17 113/70   03/31/17 127/85   05/13/16 119/76    Weight from Last 3 Encounters:   10/20/17 70.3 kg (155 lb)   03/31/17 68.3 kg (150 lb 9.6 oz)   01/24/17 67.1 kg (148 lb)              We Performed the Following     Chlamydia PCR (Clinic Collect)     Gonorrhea PCR          Today's Medication Changes          These changes are accurate as of: 10/20/17  6:10 PM.  If you have any questions, ask your nurse or doctor.               These medicines have changed or have updated prescriptions.        Dose/Directions    norelgestromin-ethinyl estradiol 150-35 MCG/24HR patch   Commonly known as:  ORTHO EVRA   This may have changed:  additional instructions   Used for:  Endometriosis   Changed by:  Hoda Pearson MD        Dose:  1 patch   Place 1 patch " onto the skin once a week Continuous use, no weeks off   Quantity:  12 patch   Refills:  0            Where to get your medicines      These medications were sent to [facility] [place], [place] - [address]  [address], [facility] [address]     Phone:  [phone]     letrozole 2.5 MG tablet    norelgestromin-ethinyl estradiol 150-35 MCG/24HR patch                Primary Care Provider Office Phone # Fax #    Meche Cast [phone]       [address] [place] [address]  [facility] [address]        Equal Access to Services     [facility]: Hadii aad ku hadasho Sojuali, waaxda luqadaha, qaybta kaalmada adeegyada, rosina malagon . So [facility] [phone].    ATENCIÓN: Si habla español, tiene a fox disposición servicios gratuitos de asistencia lingüística. Llame al [phone].    We comply with applicable federal civil rights laws and [place] laws. We do not discriminate on the basis of race, color, national origin, age, disability, sex, sexual orientation, or gender identity.            Thank you!     Thank you for choosing [facility]  for your care. Our goal is always to provide you with excellent care. Hearing back from our patients is one way we can continue to improve our services. Please take a few minutes to complete the written survey that you may receive in the mail after your visit with us. Thank you!             Your Updated Medication List - Protect others around you: Learn how to safely use, store and throw away your medicines at www.disposemymeds.org.          This list is accurate as of: 10/20/17  6:10 PM.  Always use your most recent med list.                   Brand Name Dispense Instructions for use Diagnosis    ALLEGRA PO      Take 30 mg by mouth daily    Thyrotoxicosis, exogenous iatrogenic, Hypothyroidism       letrozole 2.5 MG tablet    FEMARA    90 tablet    Take 1 tablet (2.5 mg) by mouth daily     Endometriosis       MULTIVITAL PO      Take by mouth daily        naproxen 500 MG tablet    NAPROSYN    60 tablet    Take 1 tablet (500 mg) by mouth 2 times daily (with meals)    Effusion of lower leg joint       norelgestromin-ethinyl estradiol 150-35 MCG/24HR patch    ORTHO EVRA    12 patch    Place 1 patch onto the skin once a week Continuous use, no weeks off    Endometriosis       SYNTHROID 88 MCG tablet   Generic drug:  levothyroxine     90 tablet    Monday-Saturday: (1 tablet/day);  Sunday: (0.5 tablet)    Hypothyroidism, unspecified type, Thyrotoxicosis, exogenous iatrogenic       vitamin D 2000 UNITS Caps      Take 2 tablets by mouth daily

## 2017-10-20 NOTE — LETTER
10/20/2017       RE: Karishma Saucedo  [address]  [facility] [address]     Dear Colleague,    Thank you for referring your patient, Karishma Saucedo, to the [facility] at [facility]. Please see a copy of my visit note below.        Progress Note    SUBJECTIVE:  Karishma Saucedo is an 42 year old, , who requests an Annual Preventive Exam.       Concerns today include: busy year, father dx with glioblastoma and [place]ice [place]. Endometriosis well controlled with OCP and letrozole.     Menstrual History:  Menstrual History 2016 2016 10/20/2017   LAST MENSTRUAL PERIOD 2015       Last    Lab Results   Component Value Date    PAP NIL 2014     History of abnormal Pap smear: NO - age 30-65 PAP every 5 years with negative HPV co-testing recommended        Mammogram current: yes  Last Mammogram:   No results found.     Last Colonoscopy:  No results found for this or any previous visit.      HISTORY:    Current Outpatient Prescriptions on File Prior to Visit:  SYNTHROID 88 MCG tablet Monday-Saturday: (1 tablet/day); : (0.5 tablet)   naproxen (NAPROSYN) 500 MG tablet Take 1 tablet (500 mg) by mouth 2 times daily (with meals)   Fexofenadine HCl (ALLEGRA PO) Take 30 mg by mouth daily   Cholecalciferol (VITAMIN D) 2000 UNITS CAPS Take 2 tablets by mouth daily   Multiple Vitamins-Minerals (MULTIVITAL PO) Take by mouth daily   [DISCONTINUED] norelgestromin-ethinyl estradiol (ORTHO EVRA) 150-35 MCG/24HR patch Place 1 patch onto the skin once a week   [DISCONTINUED] letrozole (FEMARA) 2.5 MG tablet Take 1 tablet (2.5 mg) by mouth daily     No current facility-administered medications on file prior to visit.   Allergies   Allergen Reactions     Augmentin Nausea and Vomiting     Shellfish Allergy Hives     Immunization History   Administered Date(s) Administered     Cholera, unspecified formulation  2006, 2007, 2008, 2010, 2012, 2012     HEPA 2006     HepB 2006     Influenza (IIV3) 2014     Influenza Vaccine IM 3yrs+ 4 Valent IIV4 2014     MMR 2006     TD (ADULT, 7+) 2006     Typhoid IM 2006     Typhoid Oral 2012, 2012       Obstetric History       T0      L0     SAB0   TAB0   Ectopic0   Multiple0   Live Births0    Obstetric Comments   LMP 2014     Past Medical History:   Diagnosis Date     Dysmenorrhea      Hypothyroidism      Past Surgical History:   Procedure Laterality Date     NO HISTORY OF SURGERY       Family History   Problem Relation Age of Onset     Thyroid Disease Maternal Uncle      DIABETES Mother      Hypertension Maternal Uncle      DIABETES Father      Thyroid Disease Father      Other Cancer Father      glioblastoma     Parkinsonism Father      Thyroid Disease Other      Hyperlipidemia Other      CANCER No family hx of      Social History     Social History     Marital status:      Spouse name: N/A     Number of children: N/A     Years of education: N/A     Social History Main Topics     Smoking status: Never Smoker     Smokeless tobacco: Never Used     Alcohol use 3.0 oz/week      Comment: 1-2 drinks 3 days/week     Drug use: No     Sexual activity: Yes     Partners: Male     Birth control/ protection: Pill, Patch      Comment: Nuvaring in the past     Other Topics Concern     Exercise Yes     cardio/swim/weights     Social History Narrative     working for University    Moved from [place]    She is a nurse by training    , no kids         (1 miscarriage)    No STIs    Menses regular    Has nuvaring    Pap--none abnormal    PAP      NIL   2014               ROS  [unfilled]  No flowsheet data found.  MICHELLE-7 SCORE 2016 10/18/2017   Total Score 0 (minimal anxiety) 0 (minimal anxiety)   Total Score - 0         EXAM:  Blood pressure 113/70, pulse 96, height  "1.575 m (5' 2\"), weight 70.3 kg (155 lb), last menstrual period 09/14/2017, not currently breastfeeding. Body mass index is 28.35 kg/(m^2).  General - pleasant female in no acute distress.  Skin - no suspicious lesions or rashes  EENT-  PERRLA, euthyroid with out palpable nodules  Neck - supple without lymphadenopathy.  Lungs - clear to auscultation bilaterally.  Heart - regular rate and rhythm without murmur.  Abdomen - soft, nontender, nondistended, no masses or organomegaly noted.  Musculoskeletal - no gross deformities.  Neurological - normal strength, sensation, and mental status.    Breast Exam:  Breast: Without visible skin changes. No dimpling or lesions seen.   Breasts supple, non-tender with palpation, no dominant mass, nodularity, or nipple discharge noted bilaterally. Axillary nodes negative.      Pelvic Exam:  EG/BUS: Normal genital architecture without lesions, erythema or abnormal secretions Bartholin's, Urethra, [place]'s normal   Urethral meatus: normal   Urethra: no masses, tenderness, or scarring   Bladder: no masses or tenderness   Vagina: moist, pink, rugae with creamy, white and odorless  secretions  Cervix: Nulliparous,, no lesions and pink, moist, closed, without lesion or CMT  Uterus: anteverted,  and small, smooth, firm, mobile w/o pain  Adnexa: Within normal limits and No masses, nodularity, tenderness  Rectum:anus normal       ASSESSMENT:  Encounter Diagnoses   Name Primary?     Family history of diabetes mellitus Yes     STD exposure      Encounter for screening mammogram for breast cancer      Endometriosis         PLAN:   Orders Placed This Encounter   Procedures     Mammo Screening digital (bilateral)     Lipid panel     Glucose     Hemoglobin A1c     25- OH-Vitamin D     Orders Placed This Encounter   Medications     norelgestromin-ethinyl estradiol (ORTHO EVRA) 150-35 MCG/24HR patch     Sig: Place 1 patch onto the skin once a week Continuous use, no weeks off     Dispense:  12 patch "     Refill:  0     letrozole (FEMARA) 2.5 MG tablet     Sig: Take 1 tablet (2.5 mg) by mouth daily     Dispense:  90 tablet     Refill:  3     Stress management: doing well with therapist and other support.         Again, thank you for allowing me to participate in the care of your patient.      Sincerely,    Hoda Pearson MD

## 2017-10-20 NOTE — PATIENT INSTRUCTIONS

## 2017-10-20 NOTE — PROGRESS NOTES
Progress Note    SUBJECTIVE:  Karishma Saucedo is an 42 year old, , who requests an Annual Preventive Exam.       Concerns today include: busy year, father dx with glioblastoma and is on hospice [place]. Endometriosis well controlled with OCP and letrozole.     Menstrual History:  Menstrual History 2016 2016 10/20/2017   LAST MENSTRUAL PERIOD 2015       Last    Lab Results   Component Value Date    PAP NIL 2014     History of abnormal Pap smear: NO - age 30-65 PAP every 5 years with negative HPV co-testing recommended        Mammogram current: yes  Last Mammogram:   No results found.     Last Colonoscopy:  No results found for this or any previous visit.      HISTORY:    Current Outpatient Prescriptions on File Prior to Visit:  SYNTHROID 88 MCG tablet Monday-Saturday: (1 tablet/day); : (0.5 tablet)   naproxen (NAPROSYN) 500 MG tablet Take 1 tablet (500 mg) by mouth 2 times daily (with meals)   Fexofenadine HCl (ALLEGRA PO) Take 30 mg by mouth daily   Cholecalciferol (VITAMIN D) 2000 UNITS CAPS Take 2 tablets by mouth daily   Multiple Vitamins-Minerals (MULTIVITAL PO) Take by mouth daily   [DISCONTINUED] norelgestromin-ethinyl estradiol (ORTHO EVRA) 150-35 MCG/24HR patch Place 1 patch onto the skin once a week   [DISCONTINUED] letrozole (FEMARA) 2.5 MG tablet Take 1 tablet (2.5 mg) by mouth daily     No current facility-administered medications on file prior to visit.   Allergies   Allergen Reactions     Augmentin Nausea and Vomiting     Shellfish Allergy Hives     Immunization History   Administered Date(s) Administered     Cholera, unspecified formulation 2006, 2007, 2008, 2010, 2012, 2012     HEPA 2006     HepB 2006     Influenza (IIV3) 2014     Influenza Vaccine IM 3yrs+ 4 Valent IIV4 2014     MMR 2006     TD (ADULT, 7+) 2006     Typhoid IM 2006     Typhoid Oral 2012,  "2012       Obstetric History       T0      L0     SAB0   TAB0   Ectopic0   Multiple0   Live Births0    Obstetric Comments   LMP 2014     Past Medical History:   Diagnosis Date     Dysmenorrhea      Hypothyroidism      Past Surgical History:   Procedure Laterality Date     NO HISTORY OF SURGERY       Family History   Problem Relation Age of Onset     Thyroid Disease Maternal Uncle      DIABETES Mother      Hypertension Maternal Uncle      DIABETES Father      Thyroid Disease Father      Other Cancer Father      glioblastoma     Parkinsonism Father      Thyroid Disease Other      Hyperlipidemia Other      CANCER No family hx of      Social History     Social History     Marital status:      Spouse name: N/A     Number of children: N/A     Years of education: N/A     Social History Main Topics     Smoking status: Never Smoker     Smokeless tobacco: Never Used     Alcohol use 3.0 oz/week      Comment: 1-2 drinks 3 days/week     Drug use: No     Sexual activity: Yes     Partners: Male     Birth control/ protection: Pill, Patch      Comment: Nuvaring in the past     Other Topics Concern     Exercise Yes     cardio/swim/weights     Social History Narrative     working for University    Moved from [place]    She is a nurse by training    , no kids         (1 miscarriage)    No STIs    Menses regular    Has nuvaring    Pap--none abnormal    PAP      NIL   2014               ROS  [unfilled]  No flowsheet data found.  MICHELLE-7 SCORE 2016 10/18/2017   Total Score 0 (minimal anxiety) 0 (minimal anxiety)   Total Score - 0         EXAM:  Blood pressure 113/70, pulse 96, height 1.575 m (5' 2\"), weight 70.3 kg (155 lb), last menstrual period 2017, not currently breastfeeding. Body mass index is 28.35 kg/(m^2).  General - pleasant female in no acute distress.  Skin - no suspicious lesions or rashes  EENT-  PERRLA, euthyroid with out palpable nodules  Neck - supple without " lymphadenopathy.  Lungs - clear to auscultation bilaterally.  Heart - regular rate and rhythm without murmur.  Abdomen - soft, nontender, nondistended, no masses or organomegaly noted.  Musculoskeletal - no gross deformities.  Neurological - normal strength, sensation, and mental status.    Breast Exam:  Breast: Without visible skin changes. No dimpling or lesions seen.   Breasts supple, non-tender with palpation, no dominant mass, nodularity, or nipple discharge noted bilaterally. Axillary nodes negative.      Pelvic Exam:  EG/BUS: Normal genital architecture without lesions, erythema or abnormal secretions Bartholin's, Urethra, [place]'s normal   Urethral meatus: normal   Urethra: no masses, tenderness, or scarring   Bladder: no masses or tenderness   Vagina: moist, pink, rugae with creamy, white and odorless  secretions  Cervix: Nulliparous,, no lesions and pink, moist, closed, without lesion or CMT  Uterus: anteverted,  and small, smooth, firm, mobile w/o pain  Adnexa: Within normal limits and No masses, nodularity, tenderness  Rectum:anus normal       ASSESSMENT:  Encounter Diagnoses   Name Primary?     Family history of diabetes mellitus Yes     STD exposure      Encounter for screening mammogram for breast cancer      Endometriosis         PLAN:   Orders Placed This Encounter   Procedures     Mammo Screening digital (bilateral)     Lipid panel     Glucose     Hemoglobin A1c     25- OH-Vitamin D     Orders Placed This Encounter   Medications     norelgestromin-ethinyl estradiol (ORTHO EVRA) 150-35 MCG/24HR patch     Sig: Place 1 patch onto the skin once a week Continuous use, no weeks off     Dispense:  12 patch     Refill:  0     letrozole (FEMARA) 2.5 MG tablet     Sig: Take 1 tablet (2.5 mg) by mouth daily     Dispense:  90 tablet     Refill:  3     Stress management: doing well with therapist and other support.   Hoda Pearson          Answers for HPI/ROS submitted by the patient on 10/18/2017   MICHELLE 7  TOTAL SCORE: 0  PHQ-2 Score: 0  General Symptoms: No  Skin Symptoms: No  HENT Symptoms: No  EYE SYMPTOMS: No  HEART SYMPTOMS: No  LUNG SYMPTOMS: No  INTESTINAL SYMPTOMS: No  URINARY SYMPTOMS: No  GYNECOLOGIC SYMPTOMS: Yes  BREAST SYMPTOMS: Yes  SKELETAL SYMPTOMS: No  BLOOD SYMPTOMS: No  NERVOUS SYSTEM SYMPTOMS: No  MENTAL HEALTH SYMPTOMS: No  Heavy or painful periods: Yes  Pain: Yes

## 2017-10-22 DIAGNOSIS — E03.9 HYPOTHYROIDISM, UNSPECIFIED TYPE: Primary | ICD-10-CM

## 2017-10-22 LAB
C TRACH DNA SPEC QL NAA+PROBE: NEGATIVE
N GONORRHOEA DNA SPEC QL NAA+PROBE: NEGATIVE
SPECIMEN SOURCE: NORMAL
SPECIMEN SOURCE: NORMAL

## 2017-10-22 RX ORDER — LEVOTHYROXINE SODIUM 75 MCG
75 TABLET ORAL DAILY
Qty: 90 TABLET | Refills: 3 | Status: SHIPPED | OUTPATIENT
Start: 2017-10-22 | End: 2018-07-10

## 2017-10-23 ENCOUNTER — DOCUMENTATION ONLY (OUTPATIENT)
Dept: ENDOCRINOLOGY | Facility: CLINIC | Age: 42
End: 2017-10-23

## 2017-10-23 NOTE — PROGRESS NOTES
"Review of 19 pages of outside records, received from [facility]  The records cover [id number] - they are not what we were looking for.     6/1/11 provider not - refers to Lt4 150 mcg/da nd cytomel 12. 5 mcg bid dosing.   10/28/13 provider note refers to LT4 112 mcg/ay and cytoeml 125 mcg bid - \"TSH remains suppressed with normal free T4 at 12 and free T3 at 6.3. We agreed at thsi time that she will continue her current treatmetn as it will be very difficult to evaluate symptoms until she has settled.  There is no short term risk to a low tSH\" - this per Dr Cyrus Mendoza.    Labs reports  6/30/11: TSH < 0.01, free T4 13, free T3 6.8 pM, ferritin 155  3/13/12: TSH < 0.01, free T4 15, free T3 8.2 pM (4-7.8)  4/7/13: TSH < 0.01, free T4 15 pM  7/23/13: TSH < 0.01, Free T4 11 pM, free T3 6.1 pM, ferritin 164 mcg/L, Zn 15.4  10/24/13: TSH < 0.01, free T4 12 (11-22 pM), free T3  6.3 (4.0-7.8 pM)      10/24/17 review of 2nd faxd batch of outside records     Lab reports  7/14/05: TSH 9.62  7/19/05: TSH 8.19, TPO 1653 (< 35 Ku/L), free T4 14 (11-22 pM)  7/28/05: TSH 6.69, free T4 15 pM  12/6/05: TSH 4.24  9/8/06: TSH 3.86, free T4 1.6  3/20/07: TSH 5.07  9/19/07: TSH 4.35  1/28/08: handwritten note - she is currently on Synthroid 100 mcg/day. This dose has been stable since 9/06.    4/28/08: TSH 7.04, free T4 17 pM  7/24/08: TSH 7.12, free T4 16, free T3 4.0 pM  9/29/08: TSH 3.59, fasting glucose 5.1 mM (3.5-5.6), free T4 17 pM, free T3 3.9 pM (4-7.8)  12/30/08: TSH 5.39  7/13/09: TSH 5.95-  ? On LT4 150 mcg/day  11/2/09: Tsh 0.21- ? On LT4 200 mcg/day  7/26/10: TSH 0.18  11/29/10: free T3 7.3 pM (3.5-6.5), free T4 17.9 (11-22 pM), TSH < 0.01- on LT4 150 and cytomel 12.5 bid.     Provider notes  8/11/09: On 150 mcg/day LT4; recommendation to increase dose of LT4 to 175 mcg.  \"Ifshe gets any symptoms of low energy again, she will immediately increase the dose o 200 mcg without waiting for a TSH " "measurement\"  "

## 2017-10-24 ENCOUNTER — RADIANT APPOINTMENT (OUTPATIENT)
Dept: MAMMOGRAPHY | Facility: CLINIC | Age: 42
End: 2017-10-24
Payer: COMMERCIAL

## 2017-10-24 DIAGNOSIS — Z12.31 ENCOUNTER FOR SCREENING MAMMOGRAM FOR BREAST CANCER: ICD-10-CM

## 2017-10-24 PROCEDURE — G0202 SCR MAMMO BI INCL CAD: HCPCS

## 2017-11-01 ENCOUNTER — RADIANT APPOINTMENT (OUTPATIENT)
Dept: MAMMOGRAPHY | Facility: CLINIC | Age: 42
End: 2017-11-01
Attending: OBSTETRICS & GYNECOLOGY

## 2017-11-01 DIAGNOSIS — R92.8 ABNORMAL MAMMOGRAM OF RIGHT BREAST: ICD-10-CM

## 2017-11-06 ASSESSMENT — ENCOUNTER SYMPTOMS: BREAST PAIN: 1

## 2017-11-10 DIAGNOSIS — Z83.3 FAMILY HISTORY OF DIABETES MELLITUS: ICD-10-CM

## 2017-11-10 LAB
CHOLEST SERPL-MCNC: 157 MG/DL
DEPRECATED CALCIDIOL+CALCIFEROL SERPL-MC: 59 UG/L (ref 20–75)
GLUCOSE SERPL-MCNC: 82 MG/DL (ref 70–99)
HBA1C MFR BLD: 5.3 % (ref 4.3–6)
HDLC SERPL-MCNC: 46 MG/DL
LDLC SERPL CALC-MCNC: 89 MG/DL
NONHDLC SERPL-MCNC: 111 MG/DL
TRIGL SERPL-MCNC: 111 MG/DL

## 2017-11-10 PROCEDURE — 36415 COLL VENOUS BLD VENIPUNCTURE: CPT | Performed by: OBSTETRICS & GYNECOLOGY

## 2017-11-10 PROCEDURE — 83036 HEMOGLOBIN GLYCOSYLATED A1C: CPT | Performed by: OBSTETRICS & GYNECOLOGY

## 2017-11-10 PROCEDURE — 80061 LIPID PANEL: CPT | Performed by: OBSTETRICS & GYNECOLOGY

## 2017-11-10 PROCEDURE — 82306 VITAMIN D 25 HYDROXY: CPT | Performed by: OBSTETRICS & GYNECOLOGY

## 2017-11-10 PROCEDURE — 82947 ASSAY GLUCOSE BLOOD QUANT: CPT | Performed by: OBSTETRICS & GYNECOLOGY

## 2017-11-20 ENCOUNTER — OFFICE VISIT (OUTPATIENT)
Dept: INTERNAL MEDICINE | Facility: CLINIC | Age: 42
End: 2017-11-20

## 2017-11-20 VITALS
RESPIRATION RATE: 16 BRPM | DIASTOLIC BLOOD PRESSURE: 79 MMHG | WEIGHT: 153.3 LBS | HEART RATE: 98 BPM | SYSTOLIC BLOOD PRESSURE: 120 MMHG | BODY MASS INDEX: 28.04 KG/M2

## 2017-11-20 DIAGNOSIS — E05.90 HYPERTHYROIDISM: Primary | ICD-10-CM

## 2017-11-20 DIAGNOSIS — R92.8 ABNORMAL MAMMOGRAM: ICD-10-CM

## 2017-11-20 DIAGNOSIS — Z82.0 FAMILY HISTORY OF PARKINSON DISEASE: ICD-10-CM

## 2017-11-20 ASSESSMENT — PAIN SCALES - GENERAL: PAINLEVEL: NO PAIN (0)

## 2017-11-20 NOTE — PATIENT INSTRUCTIONS
[facility]: [phone]     [facility] Medication Refill Request Information:  * Please contact your pharmacy regarding ANY request for medication refills.  ** [facility] Prescription Fax = [phone]  * Please allow 3 business days for routine medication refills.  * Please allow 5 business days for controlled substance medication refills.     [facility] Test Result notification information:  *You will be notified with in 7-10 days of your appointment day regarding the results of your test.  If you are on MyChart you will be notified as soon as the provider has reviewed the results and signed off on them.

## 2017-11-20 NOTE — NURSING NOTE
Chief Complaint   Patient presents with     Results     patient would like to discuss family hx and results.  Physical and pap were done with ob/gyn recently     DARREN TITUS at 7:28 AM on 11/20/2017.

## 2017-11-20 NOTE — PROGRESS NOTES
Rooming Note  Patient is up to date with all health maintenance items    DARREN TITUS at 7:34 AM on 11/20/2017.

## 2017-11-20 NOTE — MR AVS SNAPSHOT
After Visit Summary   11/20/2017    Karishma Saucedo    MRN: [id number]           Patient Information     Date Of Birth          1975        Visit Information        Provider Department      11/20/2017 7:30 AM Meche Cast MD [facility]        Care Instructions    Primary Care Center: [phone]     [facility] Medication Refill Request Information:  * Please contact your pharmacy regarding ANY request for medication refills.  ** [facility] Prescription Fax = [phone]  * Please allow 3 business days for routine medication refills.  * Please allow 5 business days for controlled substance medication refills.     [facility] Test Result notification information:  *You will be notified with in 7-10 days of your appointment day regarding the results of your test.  If you are on MyChart you will be notified as soon as the provider has reviewed the results and signed off on them.            Follow-ups after your visit        Your next 10 appointments already scheduled     Dec 21, 2017  1:30 PM CST   US BREAST RIGHT LIMITED 1-3 QUAD with [id number]   [facility] ([facility])    [address] [place] [id number] [phone]           Please bring a list of your medicines (including vitamins, minerals and over-the-counter drugs). Also, tell your doctor about any allergies you may have. Wear comfortable clothes and leave your valuables at home.  You do not need to do anything special to prepare for your exam.  Please call the Imaging Department at your exam site with any questions.              Who to contact     Please call your clinic at [phone] to:    Ask questions about your health    Make or cancel appointments    Discuss your medicines    Learn about your test results    Speak to your doctor   If you have compliments or concerns about an experience at your clinic, or if you wish to file a  complaint, please contact [facility] Patient Relations at [phone] or email us at [email or web address]         Additional Information About Your Visit        [employer] [facility] Information     [employer] gives you secure access to your electronic health record. If you see a primary care provider, you can also send messages to your care team and make appointments. If you have questions, please call your primary care clinic.  If you do not have a primary care provider, please call [phone] and they will assist you.      [employer] is an electronic gateway that provides easy, online access to your medical records. With [employer], you can request a clinic appointment, read your test results, renew a prescription or communicate with your care team.     To access your existing account, please contact your [facility] or call [phone] for assistance.        Care EveryWhere ID     This is your Care EveryWhere ID. This could be used by other organizations to access your [place] medical records  [id number]        Your Vitals Were     Pulse Respirations BMI (Body Mass Index)             98 16 28.04 kg/m2          Blood Pressure from Last 3 Encounters:   11/20/17 120/79   10/20/17 113/70   03/31/17 127/85    Weight from Last 3 Encounters:   11/20/17 69.5 kg (153 lb 4.8 oz)   10/20/17 70.3 kg (155 lb)   03/31/17 68.3 kg (150 lb 9.6 oz)              Today, you had the following     No orders found for display         Today's Medication Changes          These changes are accurate as of: 11/20/17  7:54 AM.  If you have any questions, ask your nurse or doctor.               Stop taking these medicines if you haven't already. Please contact your care team if you have questions.     MULTIVITAL PO   Stopped by:  Meche Cast MD           naproxen 500 MG tablet   Commonly known as:  NAPROSYN   Stopped by:  Meche Cast MD                    Primary Care  Provider Office Phone # Fax #    Meche Cast [phone]       [address] [place] [address]  [facility] [address]        Equal Access to Services     MELITA SANDHU : Hadii aad ku hadbryanjosefa Juanali, wagabinoda lufranci, qaswatita kawallyda nydia, rosina morgan shaistarobin emmanuelkirt manzanares. So [facility] [phone].    ATENCIÓN: Si habla español, tiene a fox disposición servicios gratuitos de asistencia lingüística. Llame al [phone].    We comply with applicable federal civil rights laws and [place] laws. We do not discriminate on the basis of race, color, national origin, age, disability, sex, sexual orientation, or gender identity.            Thank you!     Thank you for choosing [facility]  for your care. Our goal is always to provide you with excellent care. Hearing back from our patients is one way we can continue to improve our services. Please take a few minutes to complete the written survey that you may receive in the mail after your visit with us. Thank you!             Your Updated Medication List - Protect others around you: Learn how to safely use, store and throw away your medicines at www.disposemymeds.org.          This list is accurate as of: 11/20/17  7:54 AM.  Always use your most recent med list.                   Brand Name Dispense Instructions for use Diagnosis    ALLEGRA PO      Take 30 mg by mouth daily    Thyrotoxicosis, exogenous iatrogenic, Hypothyroidism       letrozole 2.5 MG tablet    FEMARA    90 tablet    Take 1 tablet (2.5 mg) by mouth daily    Endometriosis       norelgestromin-ethinyl estradiol 150-35 MCG/24HR patch    ORTHO EVRA    12 patch    Place 1 patch onto the skin once a week Continuous use, no weeks off    Endometriosis       SYNTHROID 75 MCG tablet   Generic drug:  levothyroxine     90 tablet    Take 1 tablet (75 mcg) by mouth daily    Hypothyroidism, unspecified type       vitamin D 2000 UNITS Caps      Take 2 tablets by mouth daily

## 2017-11-20 NOTE — PROGRESS NOTES
[facility]  Primary Care Center   EStablished Patient Encounter  Meche Cast MD  11/20/2017     Chief Complaint:   Results (patient would like to discuss family hx and results.  Physical and pap were done with ob/gyn recently)      History of Present Illness:   Karishma Saucedo is a 42 year old female with history of autoimmune hypothyroidism, currently iatrogenically hyperthyroid and endometriosis, who returns to clinic to review recent lab results. She was recently seen for her annual physical exam and pelvic exam with Dr. Pearson in OB/Gyn (10/20/17) at which time she underwent screening lab tests in the setting of new family history of Parkinson's disease and glioblastoma in her father. She is now taking a femara along with the OrthoEvra patch. She is doing well with this medication change. She is concerned regarding the new family history of Parkinson's. She describes intermittent, mild weakness in her bilateral hands. She does recall a mild hand tremor a few years ago, though this was when she was hyperthyroid. There is no other family history of neurologic disorders.    Additionally, we reviewed her recent thyroid history. She and her endocrinologist have been struggling to get her TSH levels within normal range, as the TSH has been low for quite some time. She continues to decrease her thyroid medications to aide with this.     She is overall feeling well. She denies any vision changes, trouble swallowing, or chest pain. She has had no constipation or diarrhea. We reviewed the health maintenance topics including immunizations, cancer screenings, routine bloodwork, and lifestyle factors and risk behaviors.         Review of Systems:   A full 10-pt Review of Systems was performed, verified and is negative except as documented in the HPI.  All health questionnaires were reviewed, verified and relevant information documented above.    Active Medications:   Synthroid 75 mg   Orth Evra patch    Rivas Shultz  Vitamin D      Allergies:   Augmentin and Shellfish allergy      Past Medical History:  Autoimmune hypothyroidism   Dysmenorrhea     Past Surgical History:  She has no history of surgeries.     Family History:   She has a family history positive for diabetes mellitus in her mother and father. He father was recently diagnosed with Parkinson's and glioblastoma. He is status post brain surgery and doing well.      Social History:   She has no history of tobacco use. She does drink alcohol, approximately 1-2 drinks three times weekly.    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    Physical Exam:   /79  Pulse 98  Resp 16  Wt 69.5 kg (153 lb 4.8 oz)  BMI 28.04 kg/m2   Constitutional: Alert, oriented, pleasant, no acute distress  Head: Normocephalic, atraumatic  Eyes: Extra-ocular movements intact, pupils equally round and reactive bilaterally, no scleral icterus  ENT: Oropharynx clear, moist mucus membranes, good dentition  Neck: Supple, no lymphadenopathy, no thyromegaly  Cardiovascular: PMI nondisplaced, regular rate and rhythm, no murmurs, rubs or gallops, peripheral pulses full/symmetric  Respiratory: Good air movement bilaterally, lungs clear, no wheezes/rales/rhonchi  GI: Abdomen soft, bowel sounds present, nondistended, nontender, no organomegaly or masses, no rebound/guarding  Musculoskeletal: No edema, normal muscle tone, normal gait  Neurologic: Alert and oriented, cranial nerves 2-12 intact.  Skin: No rashes/lesions  Psychiatric: normal mentation, affect and mood     Laboratory Results 10/20/17:  Component      Latest Ref Rng & Units 10/20/2017   N Gonorrhea PCR      NEG:Negative Negative   Chlamydia Trachomatis PCR      NEG:Negative Negative   TSH      0.40 - 4.00 mU/L <0.01 (L)   T4 Free      0.76 - 1.46 ng/dL 0.92   Triiodothyronine (T3)      60 - 181 ng/dL 98      Assessment and Plan:  1.  Autoimmune hypothyroidism   Her TSH levels have been persistently low. She will continue to follow with endocrinology for adjustment of her medications and monitoring of TSH levels.     2. Family history of Parkinson's disease  We discussed the onset timeframe of Parkinson's disease and common symptoms to watch for. She is low risk, no concerns at this time.    3. Routine Health Maintenance   Most Recent Immunizations   Administered Date(s) Administered     Cholera, unspecified formulation 11/16/2012     HEPA 11/17/2006     HepB 11/17/2006     Influenza (IIV3) 10/15/2017     Influenza Vaccine IM 3yrs+ 4 Valent IIV4 11/14/2014     Japanese Encephalitis IM 01/13/2015     MMR 11/17/2006     TD (ADULT, 7+) 11/17/2006     TDAP Vaccine (Boostrix) 05/08/2015     Typhoid IM 04/10/2017     Typhoid Oral 11/16/2012, 11/16/2012     HPV neg  Lab Results   Component Value Date    PAP NIL 04/17/2014     Mammo:She had a mild abnormality on last mammo and will f/u in 6 weeks.    Recent lipids, vitamin D and A1c were normal.        Scribe Disclosure:   IAngeli, am serving as a scribe to document services personally performed by Meche Cast MD at this visit, based upon the provider's statements to me. All documentation has been reviewed by the aforementioned provider prior to being entered into the official medical record.     Portions of this medical record were completed by a scribe. UPON MY REVIEW AND AUTHENTICATION BY ELECTRONIC SIGNATURE, this confirms (a) I performed the applicable clinical services, and (b) the record is accurate.   Meche Cast MD  Internal Medicine    25 min spent face to face, of which >50% time spent on counseling/coordinating care exclusive of any procedure time

## 2017-12-21 ENCOUNTER — RADIANT APPOINTMENT (OUTPATIENT)
Dept: MAMMOGRAPHY | Facility: CLINIC | Age: 42
End: 2017-12-21
Attending: OBSTETRICS & GYNECOLOGY
Payer: COMMERCIAL

## 2017-12-21 DIAGNOSIS — R92.8 ABNORMAL MAMMOGRAM OF RIGHT BREAST: ICD-10-CM

## 2017-12-21 DIAGNOSIS — Z09 FOLLOW-UP EXAM: ICD-10-CM

## 2017-12-22 DIAGNOSIS — E03.9 HYPOTHYROIDISM, UNSPECIFIED TYPE: ICD-10-CM

## 2017-12-22 LAB
T4 FREE SERPL-MCNC: 0.96 NG/DL (ref 0.76–1.46)
TSH SERPL DL<=0.005 MIU/L-ACNC: <0.01 MU/L (ref 0.4–4)

## 2017-12-22 PROCEDURE — 36415 COLL VENOUS BLD VENIPUNCTURE: CPT

## 2017-12-22 PROCEDURE — 84439 ASSAY OF FREE THYROXINE: CPT

## 2017-12-22 PROCEDURE — 84443 ASSAY THYROID STIM HORMONE: CPT

## 2018-01-10 DIAGNOSIS — N80.9 ENDOMETRIOSIS: ICD-10-CM

## 2018-01-10 RX ORDER — NORELGESTROMIN AND ETHINYL ESTRADIOL 35; 150 UG/MG; UG/MG
1 PATCH TRANSDERMAL WEEKLY
Qty: 12 PATCH | Refills: 2 | Status: SHIPPED | OUTPATIENT
Start: 2018-01-10 | End: 2018-09-24

## 2018-01-10 NOTE — TELEPHONE ENCOUNTER
Received refill request for norelgestromin-ethinyl estradiol patch.  Last in clinic 10/2017.  OK to refill per protocol.

## 2018-04-18 ENCOUNTER — MYC MEDICAL ADVICE (OUTPATIENT)
Dept: ENDOCRINOLOGY | Facility: CLINIC | Age: 43
End: 2018-04-18

## 2018-06-22 ENCOUNTER — APPOINTMENT (OUTPATIENT)
Dept: GENERAL RADIOLOGY | Facility: CLINIC | Age: 43
End: 2018-06-22
Attending: INTERNAL MEDICINE
Payer: OTHER MISCELLANEOUS

## 2018-06-22 ENCOUNTER — HOSPITAL ENCOUNTER (EMERGENCY)
Facility: CLINIC | Age: 43
Discharge: HOME | End: 2018-06-22
Attending: INTERNAL MEDICINE | Admitting: INTERNAL MEDICINE
Payer: OTHER MISCELLANEOUS

## 2018-06-22 VITALS
HEART RATE: 92 BPM | TEMPERATURE: 99.3 F | OXYGEN SATURATION: 100 % | SYSTOLIC BLOOD PRESSURE: 130 MMHG | WEIGHT: 151.44 LBS | BODY MASS INDEX: 27.7 KG/M2 | RESPIRATION RATE: 16 BRPM | DIASTOLIC BLOOD PRESSURE: 85 MMHG

## 2018-06-22 DIAGNOSIS — S90.31XA CONTUSION OF RIGHT FOOT, INITIAL ENCOUNTER: ICD-10-CM

## 2018-06-22 PROCEDURE — 99283 EMERGENCY DEPT VISIT LOW MDM: CPT | Performed by: INTERNAL MEDICINE

## 2018-06-22 PROCEDURE — 73630 X-RAY EXAM OF FOOT: CPT | Mod: RT

## 2018-06-22 PROCEDURE — 99284 EMERGENCY DEPT VISIT MOD MDM: CPT | Mod: Z6 | Performed by: INTERNAL MEDICINE

## 2018-06-22 ASSESSMENT — ENCOUNTER SYMPTOMS: NUMBNESS: 0

## 2018-06-22 NOTE — ED AVS SNAPSHOT
[facility] [place], Emergency Department    [address]    [facility] [address]    Phone:  [phone]    Fax:  [phone]                                       Karishma Saucedo   MRN: [id number]    Department:  [facility], Emergency Department   Date of Visit:  6/22/2018           After Visit Summary Signature Page     I have received my discharge instructions, and my questions have been answered. I have discussed any challenges I see with this plan with the nurse or doctor.    ..........................................................................................................................................  Patient/Patient Representative Signature      ..........................................................................................................................................  Patient Representative Print Name and Relationship to Patient    ..................................................               ................................................  Date                                            Time    ..........................................................................................................................................  Reviewed by Signature/Title    ...................................................              ..............................................  Date                                                            Time

## 2018-06-22 NOTE — ED AVS SNAPSHOT
[facility], Emergency Department    [address]    [place] [address]    Phone:  [phone]    Fax:  [phone]                                       Karishma Saucedo   MRN: [id number]    Department:  [facility], Emergency Department   Date of Visit:  6/22/2018           Patient Information     Date Of Birth          1975        Your diagnoses for this visit were:     Contusion of right foot, initial encounter        You were seen by Gloria Mcallister MD.        Discharge Instructions         Foot Contusion  You have a contusion. This is also called a bruise. There is swelling and some bleeding under the skin, but no broken bones. This injury generally takes a few days to a few weeks to heal.  During that time, the bruise will typically change in color from reddish, to purple-blue, to greenish-yellow, then to yellow-brown.  Home care    Elevate the foot to reduce pain and swelling. As much as possible, sit or lie down with the foot raised about the level of your heart. This is especially important during the first 48 hours.    Ice the foot to help reduce pain and swelling. Wrap a cold source (ice pack or ice cubes in a plastic bag) in a thin towel. Apply to the bruised area for 20 minutes every 1 to 2 hours the first day. Continue this 3 to 4 times a day until the pain and swelling goes away.    Unless another medicine was prescribed, you can take acetaminophen, ibuprofen, or naproxen to control pain. (If you have chronic liver or kidney disease or ever had a stomach ulcer or gastrointestinal bleeding, talk with your healthcare provider before using these medicines.)  Follow up  Follow up with your healthcare provider or our staff as advised. Call if you are not improving within 1 to 2 weeks.  When to seek medical advice   Call your healthcare provider right away if you have any of the following:    Increased pain or swelling    Foot or leg becomes cold, blue, numb or tingly    Signs of  infection: Warmth, drainage, or increased redness or pain around the bruise    Inability to move the injured foot     Frequent bruising for unknown reasons  Date Last Reviewed: 2/1/2017 2000-2017 [employer]. [address] [place] [address], [place] [address]. All rights reserved. This information is not intended as a substitute for professional medical care. Always follow your healthcare professional's instructions.      Please make an appointment to follow up with Your Primary Care Provider in 5-10 days if you have any concerns.     Your next 10 appointments already scheduled     Jul 03, 2018  3:20 PM CDT   (Arrive by 3:05 PM)   RETURN ENDOCRINE with Lima Rodriguez MD   [facility] ([facility])    [address]  [address] [facility] [address] [phone]              24 Hour Appointment Hotline       To make an appointment at any [facility], call [phone] ([phone]). If you don't have a family doctor or clinic, we will help you find one. [place] clinics are conveniently located to serve the needs of you and your family.             Review of your medicines      Our records show that you are taking the medicines listed below. If these are incorrect, please call your family doctor or clinic.        Dose / Directions Last dose taken    ALLEGRA PO   Dose:  30 mg        Take 30 mg by mouth daily   Refills:  0        letrozole 2.5 MG tablet   Commonly known as:  FEMARA   Dose:  2.5 mg   Quantity:  90 tablet        Take 1 tablet (2.5 mg) by mouth daily   Refills:  3        norelgestromin-ethinyl estradiol 150-35 MCG/24HR patch   Commonly known as:  ORTHO EVRA   Dose:  1 patch   Quantity:  12 patch        Place 1 patch onto the skin once a week Continuous use, no weeks off   Refills:  2        SYNTHROID 75 MCG tablet   Dose:  75 mcg   Quantity:  90 tablet   Generic drug:  levothyroxine        Take 1 tablet (75 mcg) by mouth daily   Refills:   3        vitamin B complex with vitamin C Tabs tablet   Dose:  1 tablet        Take 1 tablet by mouth daily   Refills:  0        vitamin D 2000 units Caps   Dose:  2 tablet        Take 2 tablets by mouth daily   Refills:  0                Procedures and tests performed during your visit     Foot  XR, G/E 3 views, right      Orders Needing Specimen Collection     None      Pending Results     No orders found from 6/20/2018 to 6/23/2018.            Pending Culture Results     No orders found from 6/20/2018 to 6/23/2018.            Pending Results Instructions     If you had any lab results that were not finalized at the time of your Discharge, you can call the ED Lab Result RN at [phone]. You will be contacted by this team for any positive Lab results or changes in treatment. The nurses are available 7 days a week from 10A to 6:30P.  You can leave a message 24 hours per day and they will return your call.        Thank you for choosing [facility]       Thank you for choosing [facility] for your care. Our goal is always to provide you with excellent care. Hearing back from our patients is one way we can continue to improve our services. Please take a few minutes to complete the written survey that you may receive in the mail after you visit with us. Thank you!        [employer] Information     [employer] gives you secure access to your electronic health record. If you see a primary care provider, you can also send messages to your care team and make appointments. If you have questions, please call your primary care clinic.  If you do not have a primary care provider, please call [phone] and they will assist you.        Care EveryWhere ID     This is your Care EveryWhere ID. This could be used by other organizations to access your [facility] medical records  [id number]        Equal Access to Services     MELITA SANDHU : Maria Elena Alcantara, sahara conroy, rosina abdalla  onel malagon ah. So [facility] [phone].    ATENCIÓN: Si habla español, tiene a fox disposición servicios gratuitos de asistencia lingüística. Llame al [phone].    We comply with applicable federal civil rights laws and [place] laws. We do not discriminate on the basis of race, color, national origin, age, disability, sex, sexual orientation, or gender identity.            After Visit Summary       This is your record. Keep this with you and show to your community pharmacist(s) and doctor(s) at your next visit.

## 2018-06-22 NOTE — ED PROVIDER NOTES
[facility] EMERGENCY DEPARTMENT ([facility])    6/22/18       History     Chief Complaint   Patient presents with     Foot Pain     Yesterday afternoon was setting up tables for an event, when table collapsed and edge landed on the top of both feet. Right foot swollen and painful to ambulate. Left foot less swollen, but bruised. Denies numbness bilaterally.     HPI  Karishma Saucedo is a 43 year old female with no significant past medical history, no anticoagulation, presents with right foot injury/pain.  She was apparently helping her friends and a heavy object dropped onto her feet yesterday afternoon.  The left one does not seem to be that bad, but the right foot is swollen and painful, and she has had trouble walking on it.  She did get some relief from ibuprofen.    This part of the medical record was transcribed by Gildardo Patel, Medical Scribe, from a dictation done by Gloria Mcallister MD.       I have reviewed the Medications, Allergies, Past Medical and Surgical History, and Social History in the [employer] system.    Past Medical History:   Diagnosis Date     Autoimmune hypothyroidism 2005     Dysmenorrhea        Past Surgical History:   Procedure Laterality Date     NO HISTORY OF SURGERY         Family History   Problem Relation Age of Onset     Thyroid Disease Maternal Uncle      Diabetes Mother      Hypertension Maternal Uncle      Diabetes Father      Thyroid Disease Father      Other Cancer Father      glioblastoma, had surgery, 77 yo     Parkinsonism Father      Thyroid Disease Other      Hyperlipidemia Other      Cancer No family hx of        Social History   Substance Use Topics     Smoking status: Never Smoker     Smokeless tobacco: Never Used     Alcohol use 3.0 oz/week      Comment: 1-2 drinks 3 days/week       No current facility-administered medications for this encounter.      Current Outpatient Prescriptions   Medication     Cholecalciferol (VITAMIN D) 2000 UNITS CAPS     Fexofenadine HCl  (ALLEGRA PO)     letrozole (FEMARA) 2.5 MG tablet     norelgestromin-ethinyl estradiol (ORTHO EVRA) 150-35 MCG/24HR patch     SYNTHROID 75 MCG tablet     vitamin B complex with vitamin C (VITAMIN  B COMPLEX) TABS tablet        Allergies   Allergen Reactions     Augmentin Nausea and Vomiting     Shellfish Allergy Hives         Review of Systems   Musculoskeletal:        Positive for right foot pain  Positive for right foot swelling   Neurological: Negative for numbness.   All other systems reviewed and are negative.      Physical Exam   BP: (!) 124/91  Pulse: 92  Heart Rate: 107  Temp: 99.3  F (37.4  C)  Resp: 16  Weight: 68.7 kg (151 lb 7 oz)  SpO2: 98 %      Physical Exam   Musculoskeletal:        Right ankle: She exhibits decreased range of motion, swelling and ecchymosis. She exhibits no deformity, no laceration and normal pulse. Tenderness. No lateral malleolus, no medial malleolus, no AITFL, no CF ligament, no posterior TFL, no head of 5th metatarsal and no proximal fibula tenderness found. Achilles tendon normal.        Feet:        ED Course     ED Course     Procedures        Results for orders placed or performed during the hospital encounter of 06/22/18 (from the past 24 hour(s))   Foot  XR, G/E 3 views, right     Status: None    Collection Time: 06/22/18  8:29 AM    Narrative    XR FOOT RT G/E 3 VW 6/22/2018 8:29 AM    HISTORY: Pain.    COMPARISON: None.      Impression    IMPRESSION: No evidence of acute fracture or malalignment.    VADIM HOUSTON MD           Labs Ordered and Resulted from Time of ED Arrival Up to the Time of Departure from the ED - No data to display         Assessments & Plan (with Medical Decision Making)  Right foot contusion, XR neg, local care and tylenol and or ibuprofen prn, follow up with her PMD prn.       I have reviewed the nursing notes.    I have reviewed the findings, diagnosis, plan and need for follow up with the patient.    Discharge Medication List as of 6/22/2018   9:38 AM          Final diagnoses:   Contusion of right foot, initial encounter       6/22/2018   [facility], [place], EMERGENCY DEPARTMENT     Gloria Mcallister MD  06/22/18 [id number]

## 2018-06-22 NOTE — DISCHARGE INSTRUCTIONS
Foot Contusion  You have a contusion. This is also called a bruise. There is swelling and some bleeding under the skin, but no broken bones. This injury generally takes a few days to a few weeks to heal.  During that time, the bruise will typically change in color from reddish, to purple-blue, to greenish-yellow, then to yellow-brown.  Home care    Elevate the foot to reduce pain and swelling. As much as possible, sit or lie down with the foot raised about the level of your heart. This is especially important during the first 48 hours.    Ice the foot to help reduce pain and swelling. Wrap a cold source (ice pack or ice cubes in a plastic bag) in a thin towel. Apply to the bruised area for 20 minutes every 1 to 2 hours the first day. Continue this 3 to 4 times a day until the pain and swelling goes away.    Unless another medicine was prescribed, you can take acetaminophen, ibuprofen, or naproxen to control pain. (If you have chronic liver or kidney disease or ever had a stomach ulcer or gastrointestinal bleeding, talk with your healthcare provider before using these medicines.)  Follow up  Follow up with your healthcare provider or our staff as advised. Call if you are not improving within 1 to 2 weeks.  When to seek medical advice   Call your healthcare provider right away if you have any of the following:    Increased pain or swelling    Foot or leg becomes cold, blue, numb or tingly    Signs of infection: Warmth, drainage, or increased redness or pain around the bruise    Inability to move the injured foot     Frequent bruising for unknown reasons  Date Last Reviewed: 2/1/2017 2000-2017 [employer]. [address] [place] [address], [place] [address]. All rights reserved. This information is not intended as a substitute for professional medical care. Always follow your healthcare professional's instructions.      Please make an appointment to follow up with Your Primary Care Provider in 5-10 days if you  have any concerns.

## 2018-07-03 ENCOUNTER — OFFICE VISIT (OUTPATIENT)
Dept: ENDOCRINOLOGY | Facility: CLINIC | Age: 43
End: 2018-07-03
Payer: COMMERCIAL

## 2018-07-03 VITALS
BODY MASS INDEX: 27.4 KG/M2 | HEIGHT: 62 IN | SYSTOLIC BLOOD PRESSURE: 119 MMHG | HEART RATE: 86 BPM | DIASTOLIC BLOOD PRESSURE: 85 MMHG | WEIGHT: 148.9 LBS

## 2018-07-03 DIAGNOSIS — E05.80 THYROTOXICOSIS, EXOGENOUS IATROGENIC: ICD-10-CM

## 2018-07-03 DIAGNOSIS — E03.9 HYPOTHYROIDISM, UNSPECIFIED TYPE: Primary | ICD-10-CM

## 2018-07-03 ASSESSMENT — PAIN SCALES - GENERAL: PAINLEVEL: NO PAIN (0)

## 2018-07-03 ASSESSMENT — ENCOUNTER SYMPTOMS
SWOLLEN GLANDS: 0
NAIL CHANGES: 0
POOR WOUND HEALING: 0
SKIN CHANGES: 0
BRUISES/BLEEDS EASILY: 1

## 2018-07-03 NOTE — MR AVS SNAPSHOT
After Visit Summary   7/3/2018    Karishma Saucedo    MRN: [id number]           Patient Information     Date Of Birth          1975        Visit Information        Provider Department      7/3/2018 3:20 PM Lima Rodriguez MD [facility]        Today's Diagnoses     Hypothyroidism, unspecified type    -  1    Thyrotoxicosis, exogenous iatrogenic          Care Instructions    Get labs today          Follow-ups after your visit        Follow-up notes from your care team     Return in about 1 year (around 7/3/2019).      Your next 10 appointments already scheduled     Jul 10, 2018  7:15 AM CDT   LAB with  LAB   [facility] ([facility])    [address]  [facility] [address] [phone]           Please do not eat 10-12 hours before your appointment if you are coming in fasting for labs on lipids, cholesterol, or glucose (sugar). This does not apply to pregnant women. Water, hot tea and black coffee (with nothing added) are okay. Do not drink other fluids, diet soda or chew gum.              Future tests that were ordered for you today     Open Future Orders        Priority Expected Expires Ordered    TSH Routine  7/3/2019 7/3/2018    T4 free Routine  7/3/2019 7/3/2018    T3 total Routine  7/3/2019 7/3/2018            Who to contact     Please call your clinic at [phone] to:    Ask questions about your health    Make or cancel appointments    Discuss your medicines    Learn about your test results    Speak to your doctor            Additional Information About Your Visit        MyChart Information     [employer] gives you secure access to your electronic health record. If you see a primary care provider, you can also send messages to your care team and make appointments. If you have questions, please call your primary care clinic.  If you do not have a primary care provider, please call [phone] and they will assist you.       "[employer] is an electronic gateway that provides easy, online access to your medical records. With [employer], you can request a clinic appointment, read your test results, renew a prescription or communicate with your care team.     To access your existing account, please contact your [facility] or call [phone] for assistance.        Care EveryWhere ID     This is your Care EveryWhere ID. This could be used by other organizations to access your [facility] medical records  [id number]        Your Vitals Were     Pulse Height BMI (Body Mass Index)             86 1.571 m (5' 1.85\") 27.37 kg/m2          Blood Pressure from Last 3 Encounters:   07/03/18 119/85   06/22/18 130/85   11/20/17 120/79    Weight from Last 3 Encounters:   07/03/18 67.5 kg (148 lb 14.4 oz)   06/22/18 68.7 kg (151 lb 7 oz)   11/20/17 69.5 kg (153 lb 4.8 oz)               Primary Care Provider Office Phone # Fax #    Mcehe Cast [phone] [address] [address]        Equal Access to Services     MELITA SANDHU AH: Hadii liane maganao Sojonathan, waaxda luqadaha, qaybta kaalmada aderobinyada, rosina manzanares. So [facility] [phone].    ATENCIÓN: Si habla español, tiene a fox disposición servicios gratuitos de asistencia lingüística. Ll[facility] [phone].    We comply with applicable federal civil rights laws and [place] laws. We do not discriminate on the basis of race, color, national origin, age, disability, sex, sexual orientation, or gender identity.            Thank you!     Thank you for choosing [facility] ENDOCRINOLOG[facility]  for your care. Our goal is always to provide you with excellent care. Hearing back from our patients is one way we can continue to improve our services. Please take a few minutes to complete the written survey that you may receive in the mail after your visit with us. Thank you!             Your Updated Medication List - " Protect others around you: Learn how to safely use, store and throw away your medicines at www.disposemymeds.org.          This list is accurate as of 7/3/18  3:56 PM.  Always use your most recent med list.                   Brand Name Dispense Instructions for use Diagnosis    ALLEGRA PO      Take 30 mg by mouth daily    Thyrotoxicosis, exogenous iatrogenic, Hypothyroidism       letrozole 2.5 MG tablet    FEMARA    90 tablet    Take 1 tablet (2.5 mg) by mouth daily    Endometriosis       norelgestromin-ethinyl estradiol 150-35 MCG/24HR patch    ORTHO EVRA    12 patch    Place 1 patch onto the skin once a week Continuous use, no weeks off    Endometriosis       SYNTHROID 75 MCG tablet   Generic drug:  levothyroxine     90 tablet    Take 1 tablet (75 mcg) by mouth daily    Hypothyroidism, unspecified type       vitamin B complex with vitamin C Tabs tablet      Take 1 tablet by mouth daily        vitamin D 2000 units Caps      Take 2 tablets by mouth daily

## 2018-07-03 NOTE — PROGRESS NOTES
Endocrinology Consult Note    Attending ASSESSMENT/PLAN:     1.  Biochemical Thyrotoxicosis on  Synthroid 75 mcg/day .    Addendum: labs following appt on 7/10/18 continue to show pattern of subclinical thyrotoxicosis with normal free T4 and T3.  Reduce Synthroid further by 1/2 tablet /weeek to 75 * 6.5/week, divided.  Repeat labs in 3 months.    2  Autoimmune subclinical Hypothyroidism - as per # 1. We now have confirmed this on review of 2005 records      Lima Rodriguez MD      Cc/  HISTORY OF PRESENT ILLNESS Karishma presents todayfor follow up of autoimmune subclinical hypothyroidism. I last saw her 3/17.  Since then, we have finally received extensive old records for review, which document the diagnosis of subclinical hypothyroidsim in 2005 . On 7/19/05 she had TSH 8.19, TPO 1653 (, 35), free t4 14 (11-22 pM).  She was treated with rapidly escalating doses of thyroid hormones, to the point of biochemical thyrotoxicosis..   Since our lst meeting years ago, we have progressively slowly reduced the thyroid hormone treatment due to the finding of biochemical thyrotoxicosis.  At the time of our last appt she was on Synthroid 88 mcg/day and we reduced to 88 * 6.5/week, divided.  There have been multiple and extensive mychart notes since then.  On 10/22/17 we reduced to Synthroid 75 mcg/day.       REVIEW OF SYSTEMS  A lot better now that done with masters\  Making time to work out  Sleep at night is OK  Weight is down due to moving / packing  Exhausted from the   Cardiac: negative  Respiratory: negative  GI: negative    Past Medical History  Past Medical History:   Diagnosis Date     Autoimmune hypothyroidism 2005     Dysmenorrhea      Medications  Current Outpatient Prescriptions   Medication Sig Dispense Refill     Cholecalciferol (VITAMIN D) 2000 UNITS CAPS Take 2 tablets by mouth daily       Fexofenadine HCl (ALLEGRA PO) Take 30 mg by mouth daily       letrozole (FEMARA) 2.5 MG tablet Take 1 tablet (2.5 mg) by  "mouth daily 90 tablet 3     norelgestromin-ethinyl estradiol (ORTHO EVRA) 150-35 MCG/24HR patch Place 1 patch onto the skin once a week Continuous use, no weeks off 12 patch 2     SYNTHROID 75 MCG tablet Take 1 tablet (75 mcg) by mouth daily 90 tablet 3     vitamin B complex with vitamin C (VITAMIN  B COMPLEX) TABS tablet Take 1 tablet by mouth daily         Allergies  Allergies   Allergen Reactions     Augmentin Nausea and Vomiting     Shellfish Allergy Hives       Family History  family history includes Diabetes in her father and mother; Hyperlipidemia in an other family member; Hypertension in her maternal uncle; Other Cancer in her father; Parkinsonism in her father; Thyroid Disease in her father, maternal uncle, and another family member. There is no history of Cancer.    Social History  Social History   Substance Use Topics     Smoking status: Never Smoker     Smokeless tobacco: Never Used     Alcohol use 3.0 oz/week      Comment: 1-2 drinks 3 days/week     ; in the process of moving from one apartment to another, within the building; finished her masters.     Physical Exam  /85  Pulse 86  Ht 1.571 m (5' 1.85\")  Wt 67.5 kg (148 lb 14.4 oz)  BMI 27.37 kg/m2  Body mass index is 27.37 kg/(m^2).  GENERAL : pleasant young woman  In no apparent distress.She is alone  SKIN: Normal color, normal temperature, texture.    EYES: PER, No scleral icterus,  No proptosis, conjunctival redness, stare, retraction  NECK: No visible masses. No palpable adenopathy, or masses.  THYROID:  Not palpable  RESP: Lungs clear to auscultation bilaterally  CARDIAC: tachy, Regular rate and rhythm, normal S1 S2, without murmurs, rubs or gallops       NEURO: awake, alert, responds appropriately to questions..  Moves all extremities; Gait normal.  No tremor of the outstretched hand.  DTRs  2 /4 ,   EXTREMITIES: No clubbing, cyanosis or edema.    DATA REVEIW    Results for GLENIS ARAGON (MRN [id number]) as of 7/10/2018 " 10:19   Ref. Range 7/10/2018 07:34   T4 Free Latest Ref Range: 0.76 - 1.46 ng/dL 0.97   Triiodothyronine (T3) Latest Ref Range: 60 - 181 ng/dL 133   TSH Latest Ref Range: 0.40 - 4.00 mU/L <0.01 (L)     ENDO THYROID LABS-[facility] Latest Ref Rng & Units 12/22/2017 10/20/2017   TSH 0.40 - 4.00 mU/L <0.01 (L) <0.01 (L)   T4 FREE 0.76 - 1.46 ng/dL 0.96 0.92   TRIIODOTHYRONINE(T3) 60 - 181 ng/dL  98     ENDO THYROID LABS-[facility] Latest Ref Rng & Units 7/6/2017 3/28/2017   TSH 0.40 - 4.00 mU/L <0.01 (L) <0.01 (L)   T4 FREE 0.76 - 1.46 ng/dL 0.94 0.91   TRIIODOTHYRONINE(T3) 60 - 181 ng/dL 115 132

## 2018-07-03 NOTE — LETTER
7/3/2018     RE: Karishma Saucedo  [address]  [place] [address]     Dear Colleague,    Thank you for referring your patient, Karishma Saucedo, to the [facility] ENDOCRINOLOGY at [facility]. Please see a copy of my visit note below.    Endocrinology Consult Note    Attending ASSESSMENT/PLAN:     1.  Biochemical Thyrotoxicosis on  Synthroid 75 mcg/day .     2  Autoimmune subclinical Hypothyroidism - as per # 1.  I have insufficient data to confirm or refute this past diagnosis - It is possible she never had hypothyroidism.     Lima Rodriguez MD      Cc/  HISTORY OF PRESENT ILLNESS Karishma presents todayfor follow up of autoimmune subclinical hypothyroidism. I last saw her 3/17.  Since then, we have finally received extensive old records for review, which document the diagnosis of subclinical hypothyroidsim in 2005 . On 7/19/05 she had TSH 8.19, TPO 1653 (, 35), free t4 14 (11-22 pM).  She was treated with rapidly escalating doses of thyroid hormones, to the point of biochemical thyrotoxicosis..   Since our lst meeting years ago, we have progressively slowly reduced the thyroid hormone treatment due to the finding of biochemical thyrotoxicosis.  At the time of our last appt she was on Synthroid 88 mcg/day and we reduced to 88 * 6.5/week, divided.  There have been multiple and extensive mychart notes since then.  On 10/22/17 we reduced to Synthroid 75 mcg/day.       REVIEW OF SYSTEMS  A lot better now that done with masters\  Making time to work out  Sleep at night is OK  Weight is down due to moving / packing  Exhausted from the   Cardiac: negative  Respiratory: negative  GI: negative    Past Medical History  Past Medical History:   Diagnosis Date     Autoimmune hypothyroidism 2005     Dysmenorrhea      Medications  Current Outpatient Prescriptions   Medication Sig Dispense Refill     Cholecalciferol (VITAMIN D) 2000 UNITS CAPS Take 2 tablets by mouth daily    "    Fexofenadine HCl (ALLEGRA PO) Take 30 mg by mouth daily       letrozole (FEMARA) 2.5 MG tablet Take 1 tablet (2.5 mg) by mouth daily 90 tablet 3     norelgestromin-ethinyl estradiol (ORTHO EVRA) 150-35 MCG/24HR patch Place 1 patch onto the skin once a week Continuous use, no weeks off 12 patch 2     SYNTHROID 75 MCG tablet Take 1 tablet (75 mcg) by mouth daily 90 tablet 3     vitamin B complex with vitamin C (VITAMIN  B COMPLEX) TABS tablet Take 1 tablet by mouth daily         Allergies  Allergies   Allergen Reactions     Augmentin Nausea and Vomiting     Shellfish Allergy Hives       Family History  family history includes Diabetes in her father and mother; Hyperlipidemia in an other family member; Hypertension in her maternal uncle; Other Cancer in her father; Parkinsonism in her father; Thyroid Disease in her father, maternal uncle, and another family member. There is no history of Cancer.    Social History  Social History   Substance Use Topics     Smoking status: Never Smoker     Smokeless tobacco: Never Used     Alcohol use 3.0 oz/week      Comment: 1-2 drinks 3 days/week     ; in the process of moving from one apartment to another, within the building; finished her masters.     Physical Exam  /85  Pulse 86  Ht 1.571 m (5' 1.85\")  Wt 67.5 kg (148 lb 14.4 oz)  BMI 27.37 kg/m2  Body mass index is 27.37 kg/(m^2).  GENERAL : pleasant young woman  In no apparent distress.She is alone  SKIN: Normal color, normal temperature, texture.    EYES: PER, No scleral icterus,  No proptosis, conjunctival redness, stare, retraction  NECK: No visible masses. No palpable adenopathy, or masses.  THYROID:  Not palpable  RESP: Lungs clear to auscultation bilaterally  CARDIAC: tachy, Regular rate and rhythm, normal S1 S2, without murmurs, rubs or gallops       NEURO: awake, alert, responds appropriately to questions..  Moves all extremities; Gait normal.  No tremor of the outstretched hand.  DTRs  2 /4 , "   EXTREMITIES: No clubbing, cyanosis or edema.    DATA REVEIW    ENDO THYROID LABS-[facility] Latest Ref Rng & Units 12/22/2017 10/20/2017   TSH 0.40 - 4.00 mU/L <0.01 (L) <0.01 (L)   T4 FREE 0.76 - 1.46 ng/dL 0.96 0.92   TRIIODOTHYRONINE(T3) 60 - 181 ng/dL  98     ENDO THYROID LABS-[facility] Latest Ref Rng & Units 7/6/2017 3/28/2017   TSH 0.40 - 4.00 mU/L <0.01 (L) <0.01 (L)   T4 FREE 0.76 - 1.46 ng/dL 0.94 0.91   TRIIODOTHYRONINE(T3) 60 - 181 ng/dL 115 132     Again, thank you for allowing me to participate in the care of your patient.      Sincerely,    Lima Rodriguez MD

## 2018-07-10 ENCOUNTER — MYC MEDICAL ADVICE (OUTPATIENT)
Dept: ENDOCRINOLOGY | Facility: CLINIC | Age: 43
End: 2018-07-10

## 2018-07-10 DIAGNOSIS — E05.80 THYROTOXICOSIS, EXOGENOUS IATROGENIC: ICD-10-CM

## 2018-07-10 DIAGNOSIS — E03.9 HYPOTHYROIDISM, UNSPECIFIED TYPE: ICD-10-CM

## 2018-07-10 LAB
T3 SERPL-MCNC: 133 NG/DL (ref 60–181)
T4 FREE SERPL-MCNC: 0.97 NG/DL (ref 0.76–1.46)
TSH SERPL DL<=0.005 MIU/L-ACNC: <0.01 MU/L (ref 0.4–4)

## 2018-07-10 RX ORDER — LEVOTHYROXINE SODIUM 75 MCG
TABLET ORAL
Qty: 90 TABLET | Refills: 3 | Status: SHIPPED | OUTPATIENT
Start: 2018-07-10

## 2018-09-22 DIAGNOSIS — N80.9 ENDOMETRIOSIS: ICD-10-CM

## 2018-09-22 RX ORDER — NORELGESTROMIN AND ETHINYL ESTRADIOL 35; 150 UG/MG; UG/MG
1 PATCH TRANSDERMAL WEEKLY
Qty: 12 PATCH | Refills: 2 | Status: CANCELLED | OUTPATIENT
Start: 2018-09-22

## 2018-09-24 ENCOUNTER — MYC MEDICAL ADVICE (OUTPATIENT)
Dept: OBGYN | Facility: CLINIC | Age: 43
End: 2018-09-24

## 2018-09-24 DIAGNOSIS — N80.9 ENDOMETRIOSIS: ICD-10-CM

## 2018-09-24 RX ORDER — NORELGESTROMIN AND ETHINYL ESTRADIOL 35; 150 UG/MG; UG/MG
1 PATCH TRANSDERMAL WEEKLY
Qty: 12 PATCH | Refills: 0 | Status: SHIPPED | OUTPATIENT
Start: 2018-09-24 | End: 2018-12-12

## 2018-10-09 DIAGNOSIS — E03.9 HYPOTHYROIDISM, UNSPECIFIED TYPE: ICD-10-CM

## 2018-10-09 DIAGNOSIS — E05.80 THYROTOXICOSIS, EXOGENOUS IATROGENIC: ICD-10-CM

## 2018-10-09 LAB
T3 SERPL-MCNC: 102 NG/DL (ref 60–181)
T4 FREE SERPL-MCNC: 0.92 NG/DL (ref 0.76–1.46)
TSH SERPL DL<=0.005 MIU/L-ACNC: <0.01 MU/L (ref 0.4–4)

## 2018-10-09 PROCEDURE — 84439 ASSAY OF FREE THYROXINE: CPT

## 2018-10-09 PROCEDURE — 36415 COLL VENOUS BLD VENIPUNCTURE: CPT

## 2018-10-09 PROCEDURE — 84443 ASSAY THYROID STIM HORMONE: CPT

## 2018-10-09 PROCEDURE — 84480 ASSAY TRIIODOTHYRONINE (T3): CPT

## 2018-12-12 ENCOUNTER — MYC MEDICAL ADVICE (OUTPATIENT)
Dept: OBGYN | Facility: CLINIC | Age: 43
End: 2018-12-12

## 2018-12-12 DIAGNOSIS — N80.9 ENDOMETRIOSIS: ICD-10-CM

## 2018-12-12 RX ORDER — NORELGESTROMIN AND ETHINYL ESTRADIOL 35; 150 UG/MG; UG/MG
1 PATCH TRANSDERMAL WEEKLY
Qty: 12 PATCH | Refills: 0 | Status: SHIPPED | OUTPATIENT
Start: 2018-12-12 | End: 2019-02-19

## 2019-01-06 DIAGNOSIS — N80.9 ENDOMETRIOSIS: ICD-10-CM

## 2019-01-08 RX ORDER — LETROZOLE 2.5 MG/1
2.5 TABLET, FILM COATED ORAL DAILY
Qty: 90 TABLET | Refills: 3 | Status: SHIPPED | OUTPATIENT
Start: 2019-01-08 | End: 2019-05-10

## 2019-01-14 ENCOUNTER — ANCILLARY PROCEDURE (OUTPATIENT)
Dept: MAMMOGRAPHY | Facility: CLINIC | Age: 44
End: 2019-01-14
Attending: OBSTETRICS & GYNECOLOGY
Payer: COMMERCIAL

## 2019-01-14 DIAGNOSIS — Z12.31 SCREENING MAMMOGRAM, ENCOUNTER FOR: ICD-10-CM

## 2019-02-10 ENCOUNTER — MYC MEDICAL ADVICE (OUTPATIENT)
Dept: ENDOCRINOLOGY | Facility: CLINIC | Age: 44
End: 2019-02-10

## 2019-02-18 ENCOUNTER — MYC MEDICAL ADVICE (OUTPATIENT)
Dept: OBGYN | Facility: CLINIC | Age: 44
End: 2019-02-18

## 2019-02-18 DIAGNOSIS — Z30.431 SURVEILLANCE OF INTRAUTERINE CONTRACEPTIVE DEVICE: Primary | ICD-10-CM

## 2019-02-18 DIAGNOSIS — N80.9 ENDOMETRIOSIS: ICD-10-CM

## 2019-02-19 RX ORDER — NORELGESTROMIN AND ETHINYL ESTRADIOL 35; 150 UG/MG; UG/MG
1 PATCH TRANSDERMAL WEEKLY
Qty: 12 PATCH | Refills: 1 | Status: SHIPPED | OUTPATIENT
Start: 2019-02-19 | End: 2019-05-10

## 2019-02-19 NOTE — TELEPHONE ENCOUNTER
[employer] Karishma requesting BC patches to cover until appt scheduled 4/5/19. Refill sent

## 2019-04-23 DIAGNOSIS — E05.80 THYROTOXICOSIS, EXOGENOUS IATROGENIC: Primary | ICD-10-CM

## 2019-05-02 DIAGNOSIS — E05.80 THYROTOXICOSIS, EXOGENOUS IATROGENIC: ICD-10-CM

## 2019-05-02 LAB
T3 SERPL-MCNC: 129 NG/DL (ref 60–181)
T4 FREE SERPL-MCNC: 1.02 NG/DL (ref 0.76–1.46)
TSH SERPL DL<=0.005 MIU/L-ACNC: <0.01 MU/L (ref 0.4–4)

## 2019-05-10 ENCOUNTER — OFFICE VISIT (OUTPATIENT)
Dept: OBGYN | Facility: CLINIC | Age: 44
End: 2019-05-10
Attending: OBSTETRICS & GYNECOLOGY
Payer: COMMERCIAL

## 2019-05-10 VITALS
WEIGHT: 148.4 LBS | SYSTOLIC BLOOD PRESSURE: 122 MMHG | HEIGHT: 62 IN | DIASTOLIC BLOOD PRESSURE: 79 MMHG | HEART RATE: 99 BPM | BODY MASS INDEX: 27.31 KG/M2

## 2019-05-10 DIAGNOSIS — N80.9 ENDOMETRIOSIS: ICD-10-CM

## 2019-05-10 DIAGNOSIS — Z12.4 SCREENING FOR MALIGNANT NEOPLASM OF CERVIX: Primary | ICD-10-CM

## 2019-05-10 DIAGNOSIS — Z01.419 ENCOUNTER FOR GYNECOLOGICAL EXAMINATION WITHOUT ABNORMAL FINDING: ICD-10-CM

## 2019-05-10 DIAGNOSIS — Z30.431 SURVEILLANCE OF INTRAUTERINE CONTRACEPTIVE DEVICE: ICD-10-CM

## 2019-05-10 DIAGNOSIS — B37.31 CANDIDAL VULVOVAGINITIS: ICD-10-CM

## 2019-05-10 PROCEDURE — 87624 HPV HI-RISK TYP POOLED RSLT: CPT | Performed by: OBSTETRICS & GYNECOLOGY

## 2019-05-10 PROCEDURE — G0463 HOSPITAL OUTPT CLINIC VISIT: HCPCS

## 2019-05-10 PROCEDURE — G0145 SCR C/V CYTO,THINLAYER,RESCR: HCPCS | Performed by: OBSTETRICS & GYNECOLOGY

## 2019-05-10 RX ORDER — FLUCONAZOLE 150 MG/1
TABLET ORAL
Qty: 3 TABLET | Refills: 0 | Status: SHIPPED | OUTPATIENT
Start: 2019-05-10 | End: 2019-07-28

## 2019-05-10 RX ORDER — LETROZOLE 2.5 MG/1
2.5 TABLET, FILM COATED ORAL DAILY
Qty: 90 TABLET | Refills: 3 | Status: SHIPPED | OUTPATIENT
Start: 2019-05-10 | End: 2020-06-24

## 2019-05-10 RX ORDER — NORELGESTROMIN AND ETHINYL ESTRADIOL 35; 150 UG/MG; UG/MG
PATCH TRANSDERMAL
Qty: 12 PATCH | Refills: 3 | Status: SHIPPED | OUTPATIENT
Start: 2019-05-10 | End: 2020-02-29

## 2019-05-10 ASSESSMENT — MIFFLIN-ST. JEOR: SCORE: 1279.01

## 2019-05-10 NOTE — LETTER
5/10/2019     RE: Karishma Saucedo  [address]  [place] [address]     Dear Colleague,    Thank you for referring your patient, Karishma Saucedo, to the [facility] at [facility]. Please see a copy of my visit note below.    [facility]  Annual Exam    HPI:    Karishma Saucedo is a 44 year old , here for annual breast and pelvic exam. Endometriosis well controlled with Orthoevra (continuous) and Letrozole. Now following at [place] for thyroid d/o.    GYN History  - LMP: amenorrheic on continuous contraceptive for endometriosis  - Pap Smears: Last pap NILM 2014, no history of abnormal pap.     Lab Results   Component Value Date    PAP NIL 2014       OBHx  OB History    Para Term  AB Living   1 0 0 0 1 0   SAB TAB Ectopic Multiple Live Births   1 0 0 0 0      # Outcome Date GA Lbr Evan/2nd Weight Sex Delivery Anes PTL Lv   1 SAB               Obstetric Comments   LMP 2014       Past Medical History  Past Medical History:   Diagnosis Date     Autoimmune hypothyroidism      Dysmenorrhea        Past Surgical History  Past Surgical History:   Procedure Laterality Date     NO HISTORY OF SURGERY         Medications  Current Outpatient Medications   Medication     Cholecalciferol (VITAMIN D) 2000 UNITS CAPS     Fexofenadine HCl (ALLEGRA PO)     letrozole (FEMARA) 2.5 MG tablet     norelgestromin-ethinyl estradiol (ORTHO EVRA) 150-35 MCG/24HR patch     SYNTHROID 75 MCG tablet     vitamin B complex with vitamin C (VITAMIN  B COMPLEX) TABS tablet     No current facility-administered medications for this visit.        Allergies     Allergies   Allergen Reactions     Augmentin Nausea and Vomiting     Shellfish Allergy Hives     Social History     Socioeconomic History     Marital status:      Spouse name: None     Number of children: None     Years of education: None     Highest education level: None    Occupational History     Occupation: dept anesthesia      Comment: staffing   Social Needs     Financial resource strain: None     Food insecurity:     Worry: None     Inability: None     Transportation needs:     Medical: None     Non-medical: None   Tobacco Use     Smoking status: Never Smoker     Smokeless tobacco: Never Used   Substance and Sexual Activity     Alcohol use: Yes     Alcohol/week: 3.0 oz     Comment: 1-2 drinks 3 days/week     Drug use: No     Sexual activity: Yes     Partners: Male     Birth control/protection: Pill, Patch     Comment: Nuvaring in the past   Lifestyle     Physical activity:     Days per week: None     Minutes per session: None     Stress: None   Relationships     Social connections:     Talks on phone: None     Gets together: None     Attends Orthodoxy service: None     Active member of club or organization: None     Attends meetings of clubs or organizations: None     Relationship status: None     Intimate partner violence:     Fear of current or ex partner: None     Emotionally abused: None     Physically abused: None     Forced sexual activity: None   Other Topics Concern      Service Not Asked     Blood Transfusions Not Asked     Caffeine Concern Not Asked     Occupational Exposure Not Asked     Hobby Hazards Not Asked     Sleep Concern Not Asked     Stress Concern Not Asked     Weight Concern Not Asked     Special Diet Not Asked     Back Care Not Asked     Exercise Yes     Comment: cardio/swim/weights     Bike Helmet Not Asked     Seat Belt Not Asked     Self-Exams Not Asked   Social History Narrative    5/10/19    [facility]    Settling into life in [place]    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    Hoda Pearson            2016:     working for [employer], psychiatry research    Moved from [place]    She is a nurse by training    , no kids    Working in Nursing schools, doing executive masters         (1 miscarriage)    No STIs    Menses  "regular    Pap--none abnormal    PAP      NIL   2014             Family History  -   Family History   Problem Relation Age of Onset     Thyroid Disease Maternal Uncle      Diabetes Mother      Hypertension Maternal Uncle      Diabetes Father      Thyroid Disease Father      Other Cancer Father         glioblastoma, had surgery, 77 yo     Parkinsonism Father      Thyroid Disease Other      Hyperlipidemia Other      Cancer No family hx of        Review Of Systems  Skin: negative  Eyes: negative  Ears/Nose/Throat: negative  Respiratory: No shortness of breath, dyspnea on exertion, cough, or hemoptysis  Cardiovascular: negative  Gastrointestinal: negative  Genitourinary: negative  Musculoskeletal: negative  Neurologic: negative  Psychiatric: negative  Hematologic/Lymphatic/Immunologic: negative  Endocrine: negative    Preventative Health  Current or historical sexual, physical or mental abuse:   Feelings of depression: none  Seat belt usage: yes  Diet: healthy  Exercise: walking    Physical Exam  /79   Pulse 99   Ht 1.571 m (5' 1.85\")   Wt 67.3 kg (148 lb 6.4 oz)   BMI 27.27 kg/m     Gen: Well-appearing, NAD  Neck: Thyroid is not enlarged, no appreciable masses palpated. Non-tender  Abd: Soft, non-tender, non-distended  Ext: No LE edema, extremities warm and well perfused    Breast: Symmetric, no nipple discharge or retraction, no axillary lymphadenopathy, no palpable nodules or masses bilaterally    Pelvic:  Normal appearing external female genitalia. Normal hair distribution. Vagina is without lesions.  discharge. Cervix nulliparous, no lesions, no cervical motion tenderness. Uterus is small, mobile, non-tender. No adnexal tenderness or masses      Current BMI: Body mass index is 27.27 kg/m .  --Overweight = 25-29.9    Assessment/Plan  Karishma Saucedo is a 44 year old  female here for annual exam.   meds refilled    Routine Health Maintenance  Mammo wnl 2019  Pap sent today    Hoda Barboza " Michel

## 2019-05-10 NOTE — PROGRESS NOTES
[facility]  Annual Exam    HPI:    Karishma Saucedo is a 44 year old , here for annual breast and pelvic exam. Endometriosis well controlled with Orthoevra (continuous) and Letrozole. Now following at [facility] for thyroid d/o.    GYN History  - LMP: amenorrheic on continuous contraceptive for endometriosis  - Pap Smears: Last pap NILM 2014, no history of abnormal pap.     Lab Results   Component Value Date    PAP NIL 2014       OBHx  OB History    Para Term  AB Living   1 0 0 0 1 0   SAB TAB Ectopic Multiple Live Births   1 0 0 0 0      # Outcome Date GA Lbr Evan/2nd Weight Sex Delivery Anes PTL Lv   1 SAB               Obstetric Comments   LMP 2014       Past Medical History  Past Medical History:   Diagnosis Date     Autoimmune hypothyroidism      Dysmenorrhea        Past Surgical History  Past Surgical History:   Procedure Laterality Date     NO HISTORY OF SURGERY         Medications  Current Outpatient Medications   Medication     Cholecalciferol (VITAMIN D) 2000 UNITS CAPS     Fexofenadine HCl (ALLEGRA PO)     letrozole (FEMARA) 2.5 MG tablet     norelgestromin-ethinyl estradiol (ORTHO EVRA) 150-35 MCG/24HR patch     SYNTHROID 75 MCG tablet     vitamin B complex with vitamin C (VITAMIN  B COMPLEX) TABS tablet     No current facility-administered medications for this visit.        Allergies     Allergies   Allergen Reactions     Augmentin Nausea and Vomiting     Shellfish Allergy Hives     Social History     Socioeconomic History     Marital status:      Spouse name: None     Number of children: None     Years of education: None     Highest education level: None   Occupational History     Occupation: dept anesthesia      Comment: staffing   Social Needs     Financial resource strain: None     Food insecurity:     Worry: None     Inability: None     Transportation needs:     Medical: None     Non-medical: None   Tobacco Use     Smoking status: Never Smoker     Smokeless  tobacco: Never Used   Substance and Sexual Activity     Alcohol use: Yes     Alcohol/week: 3.0 oz     Comment: 1-2 drinks 3 days/week     Drug use: No     Sexual activity: Yes     Partners: Male     Birth control/protection: Pill, Patch     Comment: Nuvaring in the past   Lifestyle     Physical activity:     Days per week: None     Minutes per session: None     Stress: None   Relationships     Social connections:     Talks on phone: None     Gets together: None     Attends Bahai service: None     Active member of club or organization: None     Attends meetings of clubs or organizations: None     Relationship status: None     Intimate partner violence:     Fear of current or ex partner: None     Emotionally abused: None     Physically abused: None     Forced sexual activity: None   Other Topics Concern      Service Not Asked     Blood Transfusions Not Asked     Caffeine Concern Not Asked     Occupational Exposure Not Asked     Hobby Hazards Not Asked     Sleep Concern Not Asked     Stress Concern Not Asked     Weight Concern Not Asked     Special Diet Not Asked     Back Care Not Asked     Exercise Yes     Comment: cardio/swim/weights     Bike Helmet Not Asked     Seat Belt Not Asked     Self-Exams Not Asked   Social History Narrative    5/10/19    Living [facility]    Settling into life in [place]    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    Hoda Pearson            2016:     working for [employer], psychiatry research    Moved from [place]    She is a nurse by training    , no kids    Working in Nursing schools, doing executive masters         (1 miscarriage)    No STIs    Menses regular    Pap--none abnormal    PAP      NIL   2014             Family History  -   Family History   Problem Relation Age of Onset     Thyroid Disease Maternal Uncle      Diabetes Mother      Hypertension Maternal Uncle      Diabetes Father      Thyroid Disease Father      Other Cancer Father          "glioblastoma, had surgery, 75 yo     Parkinsonism Father      Thyroid Disease Other      Hyperlipidemia Other      Cancer No family hx of        Review Of Systems  Skin: negative  Eyes: negative  Ears/Nose/Throat: negative  Respiratory: No shortness of breath, dyspnea on exertion, cough, or hemoptysis  Cardiovascular: negative  Gastrointestinal: negative  Genitourinary: negative  Musculoskeletal: negative  Neurologic: negative  Psychiatric: negative  Hematologic/Lymphatic/Immunologic: negative  Endocrine: negative    Preventative Health  Current or historical sexual, physical or mental abuse:   Feelings of depression: none  Seat belt usage: yes  Diet: healthy  Exercise: walking    Physical Exam  /79   Pulse 99   Ht 1.571 m (5' 1.85\")   Wt 67.3 kg (148 lb 6.4 oz)   BMI 27.27 kg/m    Gen: Well-appearing, NAD  Neck: Thyroid is not enlarged, no appreciable masses palpated. Non-tender  Abd: Soft, non-tender, non-distended  Ext: No LE edema, extremities warm and well perfused    Breast: Symmetric, no nipple discharge or retraction, no axillary lymphadenopathy, no palpable nodules or masses bilaterally    Pelvic:  Normal appearing external female genitalia. Normal hair distribution. Vagina is without lesions.  discharge. Cervix nulliparous, no lesions, no cervical motion tenderness. Uterus is small, mobile, non-tender. No adnexal tenderness or masses      Current BMI: Body mass index is 27.27 kg/m .  --Overweight = 25-29.9    Assessment/Plan  Karishma Saucedo is a 44 year old  female here for annual exam.   meds refilled    Routine Health Maintenance  Mammo wnl 2019  Pap sent today    Hoda Pearson              "

## 2019-05-15 LAB
COPATH REPORT: NORMAL
PAP: NORMAL

## 2019-05-17 LAB
FINAL DIAGNOSIS: NORMAL
HPV HR 12 DNA CVX QL NAA+PROBE: NEGATIVE
HPV16 DNA SPEC QL NAA+PROBE: NEGATIVE
HPV18 DNA SPEC QL NAA+PROBE: NEGATIVE
SPECIMEN DESCRIPTION: NORMAL
SPECIMEN SOURCE CVX/VAG CYTO: NORMAL

## 2019-07-28 ENCOUNTER — HOSPITAL ENCOUNTER (EMERGENCY)
Facility: CLINIC | Age: 44
Discharge: HOME | End: 2019-07-28
Attending: EMERGENCY MEDICINE | Admitting: EMERGENCY MEDICINE
Payer: COMMERCIAL

## 2019-07-28 VITALS
SYSTOLIC BLOOD PRESSURE: 120 MMHG | OXYGEN SATURATION: 99 % | RESPIRATION RATE: 16 BRPM | DIASTOLIC BLOOD PRESSURE: 81 MMHG | WEIGHT: 150.8 LBS | TEMPERATURE: 98.1 F | HEART RATE: 84 BPM | BODY MASS INDEX: 27.72 KG/M2

## 2019-07-28 DIAGNOSIS — L50.9 URTICARIA: ICD-10-CM

## 2019-07-28 PROCEDURE — 25000131 ZZH RX MED GY IP 250 OP 636 PS 637: Performed by: EMERGENCY MEDICINE

## 2019-07-28 PROCEDURE — 99284 EMERGENCY DEPT VISIT MOD MDM: CPT | Mod: Z6 | Performed by: EMERGENCY MEDICINE

## 2019-07-28 PROCEDURE — 99283 EMERGENCY DEPT VISIT LOW MDM: CPT | Performed by: EMERGENCY MEDICINE

## 2019-07-28 PROCEDURE — 25000132 ZZH RX MED GY IP 250 OP 250 PS 637: Performed by: EMERGENCY MEDICINE

## 2019-07-28 RX ORDER — PREDNISONE 20 MG/1
60 TABLET ORAL ONCE
Status: COMPLETED | OUTPATIENT
Start: 2019-07-28 | End: 2019-07-28

## 2019-07-28 RX ORDER — PREDNISONE 20 MG/1
TABLET ORAL
Qty: 10 TABLET | Refills: 0 | Status: SHIPPED | OUTPATIENT
Start: 2019-07-28 | End: 2022-05-05

## 2019-07-28 RX ORDER — DIPHENHYDRAMINE HCL 50 MG
50 CAPSULE ORAL ONCE
Status: COMPLETED | OUTPATIENT
Start: 2019-07-28 | End: 2019-07-28

## 2019-07-28 RX ADMIN — RANITIDINE 150 MG: 150 TABLET ORAL at 08:49

## 2019-07-28 RX ADMIN — DIPHENHYDRAMINE HYDROCHLORIDE 50 MG: 50 CAPSULE ORAL at 08:49

## 2019-07-28 RX ADMIN — PREDNISONE 60 MG: 20 TABLET ORAL at 08:49

## 2019-07-28 NOTE — ED AVS SNAPSHOT
[facility], [place], Emergency Department  [address]  [facility] [address]  Phone:  [phone]  Fax:  [phone]                                    Karishma Saucedo   MRN: [id number]    Department:  [facility], Emergency Department   Date of Visit:  7/28/2019           After Visit Summary Signature Page    I have received my discharge instructions, and my questions have been answered. I have discussed any challenges I see with this plan with the nurse or doctor.    ..........................................................................................................................................  Patient/Patient Representative Signature      ..........................................................................................................................................  Patient Representative Print Name and Relationship to Patient    ..................................................               ................................................  Date                                   Time    ..........................................................................................................................................  Reviewed by Signature/Title    ...................................................              ..............................................  Date                                               Time          22EPIC Rev 08/18

## 2019-07-28 NOTE — ED PROVIDER NOTES
History     Chief Complaint   Patient presents with     Allergic Reaction     Hx of allergies, started last wednesday, got worst now having rashes, hives     HPI  Karishma Saucedo is a 44 year old female who who has a history of autoimmune hypothyroidism who presents with 5 days of worsening urticarial rash associated with itch.  Patient states she initially noticed the rash developing on her extremities on Wednesday and that has progressively worsened to cover a larger part of her skin with increasing pruritus.  She denies any difficulty breathing, tongue swelling, sensation of throat closing.  Patient has had issues with generalized allergies in the past and saw an allergist decades ago who recommended a daily Allegra which has controlled her allergies well until this week.  She denies any recent changes of laundry detergent, soap, shampoo or lotions.  She does endorse an allergy to shellfish and denies eating any shellfish recently.  Patient took 25 mg of Benadryl last night with some improvement of her symptoms.  Patient denies any shortness of breath, chest pain, fevers or chills.    I have reviewed the Medications, Allergies, Past Medical and Surgical History, and Social History in the Epic system.    Review of Systems  ROS: 14 point ROS neg other than the symptoms noted above in the HPI.    Physical Exam          Physical Exam  GEN: Well appearing, non toxic, cooperative.   HEENT: The head is normocephalic and atraumatic. Pupils are equal round and reactive to light. Extraocular motions are intact. There is no facial swelling. The neck is nontender and supple.  No overt tongue or intraoral swelling  CV: Regular rate and rhythm without murmurs rubs or gallops. 2+ radial pulses bilaterally.  PULM: Clear to auscultation bilaterally.  ABD: Soft, nontender, nondistended.   EXT: Full range of motion.  No edema.  NEURO: Cranial nerves II through XII are intact and symmetric. Bilateral upper and lower extremities  grossly show full range of motion without any focal deficits.   SKIN: Diffuse urticarial rash on extremities and trunk with mild signs of excoriation.  PSYCH: Calm and cooperative, interactive.     ED Course        Procedures             Critical Care time:  none             Labs Ordered and Resulted from Time of ED Arrival Up to the Time of Departure from the ED - No data to display         Assessments & Plan (with Medical Decision Making)   Patient is a 44-year-old female who presents with diffuse urticarial rash.  Initial vitals were within normal limits.  Exam as above and most notable for completely patent and open airway along with a diffuse urticarial rash.  Patient was given Benadryl, ranitidine, and prednisone with drastic improvement of her symptoms.  Patient states she has Benadryl at home so she was given a prescription for ranitidine and a 5-day prednisone burst.  She is advised to follow-up with her primary care doctor and a referral was also placed to go to the allergy clinic.  Patient was discharged home in stable condition but given strict return precautions for any worsening or change in symptoms.    I have reviewed the nursing notes.    I have reviewed the findings, diagnosis, plan and need for follow up with the patient.       Medication List      Started    predniSONE 20 MG tablet  Commonly known as:  DELTASONE  Take two tablets (= 40mg) each day for 5 (five) days     ranitidine 150 MG tablet  Commonly known as:  ZANTAC  150 mg, Oral, 2 TIMES DAILY            Final diagnoses:   Urticaria       7/28/2019   [facility], [place], EMERGENCY DEPARTMENT     Jhon Bowers MD  07/28/19 0937

## 2019-07-29 ENCOUNTER — MYC MEDICAL ADVICE (OUTPATIENT)
Dept: INTERNAL MEDICINE | Facility: CLINIC | Age: 44
End: 2019-07-29

## 2019-07-29 ENCOUNTER — OFFICE VISIT (OUTPATIENT)
Dept: ALLERGY | Facility: CLINIC | Age: 44
End: 2019-07-29
Payer: COMMERCIAL

## 2019-07-29 ENCOUNTER — HOSPITAL ENCOUNTER (EMERGENCY)
Facility: CLINIC | Age: 44
Discharge: HOME | End: 2019-07-29
Attending: FAMILY MEDICINE | Admitting: FAMILY MEDICINE
Payer: COMMERCIAL

## 2019-07-29 VITALS
BODY MASS INDEX: 27.97 KG/M2 | HEART RATE: 126 BPM | SYSTOLIC BLOOD PRESSURE: 117 MMHG | WEIGHT: 152.19 LBS | OXYGEN SATURATION: 99 % | RESPIRATION RATE: 16 BRPM | TEMPERATURE: 98.5 F | DIASTOLIC BLOOD PRESSURE: 89 MMHG

## 2019-07-29 DIAGNOSIS — L50.9 URTICARIA: ICD-10-CM

## 2019-07-29 DIAGNOSIS — J45.909 REACTIVE AIRWAY DISEASE WITHOUT COMPLICATION, UNSPECIFIED ASTHMA SEVERITY, UNSPECIFIED WHETHER PERSISTENT: Primary | ICD-10-CM

## 2019-07-29 DIAGNOSIS — L28.2 PRURITIC RASH: ICD-10-CM

## 2019-07-29 DIAGNOSIS — T78.40XD ALLERGIC REACTION, SUBSEQUENT ENCOUNTER: ICD-10-CM

## 2019-07-29 LAB
ALBUMIN SERPL-MCNC: 3.6 G/DL (ref 3.4–5)
ALP SERPL-CCNC: 36 U/L (ref 40–150)
ALT SERPL W P-5'-P-CCNC: 26 U/L (ref 0–50)
ANION GAP SERPL CALCULATED.3IONS-SCNC: 4 MMOL/L (ref 3–14)
AST SERPL W P-5'-P-CCNC: 21 U/L (ref 0–45)
BASOPHILS # BLD AUTO: 0 10E9/L (ref 0–0.2)
BASOPHILS NFR BLD AUTO: 0.1 %
BILIRUB SERPL-MCNC: 0.3 MG/DL (ref 0.2–1.3)
BUN SERPL-MCNC: 11 MG/DL (ref 7–30)
CALCIUM SERPL-MCNC: 8.7 MG/DL (ref 8.5–10.1)
CHLORIDE SERPL-SCNC: 106 MMOL/L (ref 94–109)
CO2 SERPL-SCNC: 27 MMOL/L (ref 20–32)
CREAT SERPL-MCNC: 0.66 MG/DL (ref 0.52–1.04)
CRP SERPL-MCNC: 6.5 MG/L (ref 0–8)
DIFFERENTIAL METHOD BLD: ABNORMAL
EOSINOPHIL # BLD AUTO: 0 10E9/L (ref 0–0.7)
EOSINOPHIL NFR BLD AUTO: 0 %
ERYTHROCYTE [DISTWIDTH] IN BLOOD BY AUTOMATED COUNT: 12.5 % (ref 10–15)
GFR SERPL CREATININE-BSD FRML MDRD: >90 ML/MIN/{1.73_M2}
GLUCOSE SERPL-MCNC: 128 MG/DL (ref 70–99)
HCT VFR BLD AUTO: 38.4 % (ref 35–47)
HGB BLD-MCNC: 12.6 G/DL (ref 11.7–15.7)
IMM GRANULOCYTES # BLD: 0.1 10E9/L (ref 0–0.4)
IMM GRANULOCYTES NFR BLD: 0.6 %
LYMPHOCYTES # BLD AUTO: 3.2 10E9/L (ref 0.8–5.3)
LYMPHOCYTES NFR BLD AUTO: 16.1 %
MCH RBC QN AUTO: 28.9 PG (ref 26.5–33)
MCHC RBC AUTO-ENTMCNC: 32.8 G/DL (ref 31.5–36.5)
MCV RBC AUTO: 88 FL (ref 78–100)
MONOCYTES # BLD AUTO: 1.3 10E9/L (ref 0–1.3)
MONOCYTES NFR BLD AUTO: 6.4 %
NEUTROPHILS # BLD AUTO: 15 10E9/L (ref 1.6–8.3)
NEUTROPHILS NFR BLD AUTO: 76.8 %
NRBC # BLD AUTO: 0 10*3/UL
NRBC BLD AUTO-RTO: 0 /100
PLATELET # BLD AUTO: 390 10E9/L (ref 150–450)
POTASSIUM SERPL-SCNC: 3.5 MMOL/L (ref 3.4–5.3)
PROT SERPL-MCNC: 7.6 G/DL (ref 6.8–8.8)
RBC # BLD AUTO: 4.36 10E12/L (ref 3.8–5.2)
SODIUM SERPL-SCNC: 137 MMOL/L (ref 133–144)
WBC # BLD AUTO: 19.5 10E9/L (ref 4–11)

## 2019-07-29 PROCEDURE — 99281 EMR DPT VST MAYX REQ PHY/QHP: CPT

## 2019-07-29 PROCEDURE — 99282 EMERGENCY DEPT VISIT SF MDM: CPT | Mod: Z6 | Performed by: FAMILY MEDICINE

## 2019-07-29 RX ORDER — BUDESONIDE AND FORMOTEROL FUMARATE DIHYDRATE 160; 4.5 UG/1; UG/1
2 AEROSOL RESPIRATORY (INHALATION) 2 TIMES DAILY
Qty: 10.2 G | Refills: 3 | Status: SHIPPED | OUTPATIENT
Start: 2019-07-29 | End: 2021-02-17

## 2019-07-29 RX ORDER — DIPHENHYDRAMINE HYDROCHLORIDE 50 MG/ML
25 INJECTION INTRAMUSCULAR; INTRAVENOUS ONCE
Status: DISCONTINUED | OUTPATIENT
Start: 2019-07-29 | End: 2019-07-29

## 2019-07-29 RX ORDER — MONTELUKAST SODIUM 10 MG/1
10 TABLET ORAL AT BEDTIME
Qty: 60 TABLET | Refills: 3 | Status: SHIPPED | OUTPATIENT
Start: 2019-07-29 | End: 2022-05-05

## 2019-07-29 RX ORDER — HYDROXYZINE HYDROCHLORIDE 25 MG/1
25 TABLET, FILM COATED ORAL 3 TIMES DAILY PRN
Qty: 30 TABLET | Refills: 3 | Status: SHIPPED | OUTPATIENT
Start: 2019-07-29

## 2019-07-29 RX ORDER — FEXOFENADINE HCL 180 MG/1
180 TABLET ORAL 2 TIMES DAILY
Qty: 90 TABLET | Refills: 3 | Status: SHIPPED | OUTPATIENT
Start: 2019-07-29

## 2019-07-29 ASSESSMENT — ENCOUNTER SYMPTOMS
SHORTNESS OF BREATH: 0
WHEEZING: 0
TROUBLE SWALLOWING: 0

## 2019-07-29 ASSESSMENT — PAIN SCALES - GENERAL: PAINLEVEL: NO PAIN (0)

## 2019-07-29 NOTE — LETTER
7/29/2019         RE: Karishma Saucedo  [address]  [facility] [place] [address]        Dear Colleague,    Thank you for referring your patient, Karishma Saucedo, to the [facility] ALLERGY. Please see a copy of my visit note below.    [facility] Allergy Note      Allergy Problem List:  1. History of urticaria with significant and recent flaring  -rx: medrol dose pack (40 mg x 5 days), daily benadryl/allegra, montelukast 10 mg at bedtime, Symbicort inhaler  -future: repeat allergy testing +/- omalizumab    Encounter Date: Jul 29, 2019    CC:   Chief Complaint   Patient presents with     Allergies     Karishma is here for an allergy consult related to possible food allergies.      History of Present Illness:  Ms. Karishma Saucedo is a 44 year old female who presents in self referral/ED follow up for diffuse urticaria. The patient has a history of hypothyroidism, seasonal allergies, and urticaria. She has a longstanding history of urticaria which was very prominent when she was a childhood. She had since developed several food allergies including shell fish and several other food allergens which was verified at one point with intradermal prick testing (per patient). She had been controlling this with Allegra for nearly two decades. She notes that last Wednesday, she had acute attack of hives throughout her body while getting ready to go to bed. She doubled her Allegra dose that night with relief. Then Saturday, she again had worsening of hives and this time the Allegra did not help. She went to ED on Sunday and was given ranitidine, benadryl and 60mg prednisone with relief though roughly 6 hours later it returned. Other than hives throughout her body, she's noticed tingling around her mouth as well as dry cough. She's been managing all of this with benadryl, prednisone and allegra though is worried she will be unable to keep up with current medication regimen. She denies new soaps,  detergents, pet or animal exposures, recent travel. She's not had any changes in medicine nor changes in formulation of crrent meds.    Past Medical History:   Patient Active Problem List   Diagnosis     Seasonal allergies     Hypothyroidism, Dx 2004     Hair loss     Contraception     Thyrotoxicosis, exogenous iatrogenic     Endometriosis     Effusion of lower leg joint     Past Medical History:   Diagnosis Date     Autoimmune hypothyroidism 2005     Dysmenorrhea      Past Surgical History:   Procedure Laterality Date     NO HISTORY OF SURGERY         Social History:  Patient reports that she has never smoked. She has never used smokeless tobacco. She reports that she drinks about 3.0 oz of alcohol per week. She reports that she does not use drugs.    Family History:  Family History   Problem Relation Age of Onset     Thyroid Disease Maternal Uncle      Diabetes Mother      Hypertension Maternal Uncle      Diabetes Father      Thyroid Disease Father      Other Cancer Father         glioblastoma, had surgery, 75 yo     Parkinsonism Father      Thyroid Disease Other      Hyperlipidemia Other      Cancer No family hx of        Medications:  Current Outpatient Medications   Medication Sig Dispense Refill     budesonide-formoterol (SYMBICORT) 160-4.5 MCG/ACT Inhaler Inhale 2 puffs into the lungs 2 times daily 10.2 g 3     montelukast (SINGULAIR) 10 MG tablet Take 1 tablet (10 mg) by mouth At Bedtime 60 tablet 3     Cholecalciferol (VITAMIN D) 2000 UNITS CAPS Take 2 tablets by mouth daily       Fexofenadine HCl (ALLEGRA PO) Take 30 mg by mouth daily       letrozole (FEMARA) 2.5 MG tablet Take 1 tablet (2.5 mg) by mouth daily 90 tablet 3     norelgestromin-ethinyl estradiol (ORTHO EVRA) 150-35 MCG/24HR patch Continuous use, no weeks off 12 patch 3     predniSONE (DELTASONE) 20 MG tablet Take two tablets (= 40mg) each day for 5 (five) days 10 tablet 0     ranitidine (ZANTAC) 150 MG tablet Take 1 tablet (150 mg) by mouth 2  times daily for 10 days 20 tablet 0     SYNTHROID 75 MCG tablet MON to SAT 1 tablet/day; SUN 0.5 tablet 90 tablet 3     vitamin B complex with vitamin C (VITAMIN  B COMPLEX) TABS tablet Take 1 tablet by mouth daily          Allergies   Allergen Reactions     Seasonal Allergies Itching     Augmentin Nausea and Vomiting     Shellfish Allergy Hives         Review of Systems:  -Constitutional: Fatigue  -Pulm: dry cough  -HEENT: Congestion, dry cough.  -Skin: As above in HPI. No additional skin concerns.    Physical exam:  Vitals: There were no vitals taken for this visit.  GEN: This is a well developed, well-nourished female in no acute distress, in a pleasant mood.  HEENT: non-injected sclera, boggy nasal turbinates, no injection of oropharynx or mucosal edema.  PULM: No wheezing, clear to auscultation, frequent dry cough  SKIN: no current evidence of wheals/hives    Impression/Plan:  1. Atopic predisposition with seasonal rhinoconjunctivitis and food allergies: longstanding atopy since childhood well controlled for two decades on anti-histamine (Allegra) with worsening in past week associated with dry cough and possible reactive airway component. Etiology could be related to a number of allergens without clear cause.     Start Allegra 180mg twice daily    Hydroxyzine 25mg at bedtime    Symbicort BID    Montelukast 10mg at bedtime    Complete 5 day course of 40mg prednisone    Labs today including CBC w/ diff, CMP, Total IgE, Anti IgE, MEHDI, CRP    Spirometry today    Return to clinic in 1 week    Will pursue skin testing in near future    2. Chronic urticaria, significant with recent flaring     See above plan    Plan in future allergy prick tests with atop    CC Referred MD Al  No address on file on close of this encounter.  Follow-up 1 week      Staff Physician Comments:  I saw and evaluated the patient with the resident and I agree with the assessment and plan as documented in the resident's note.    Claude PLASCENCIA  MD Jesus  Professor  Head of Dermato-Allergy Division  Department of Dermatology  [facility], [place], [place]      Staff Involved:  Resident(Kiet Hannon)/Staff(Dr. Lee)      Again, thank you for allowing me to participate in the care of your patient.        Sincerely,        Claude Lee MD

## 2019-07-29 NOTE — ED PROVIDER NOTES
[facility] EMERGENCY DEPARTMENT ([facility])    7/29/19       History     Chief Complaint   Patient presents with     Allergic Reaction     has been having an allergic reation since last Wednesday, seen here yesterday placed on prednisone and ranitadine, no relief today with meds and benadryl     The history is provided by the patient and medical records.     Karishma Saucedo is a 44 year old female with a medical history significant for autoimmune hypothyroidism.  Per review of patient's chart, the patient presented to the Emergency Department here yesterday with a 5-day history of worsening urticarial rash associated with itch.  Patient did not have any difficulty breathing, tongue swelling or sensation of throat closing yesterday.  Patient was given Benadryl, ranitidine and prednisone with drastic improvement of her symptoms.  The patient was discharged with a prescription for ranitidine and a 5-day course of prednisone burst; she was also given a referral to see an allergist.    The patient returns to the Emergency Department today as her urticarial rash with itch has returned.  The patient reports that she did have improvement of her symptoms yesterday for approximately 5 to 6 hours and she was able to sleep; however, yesterday at approximately 6 PM her symptoms began returning.  They have been worsening throughout the evening and today.  She reports that she took another prednisone dose last night, but she did not have any improvement.  She then took 2 tablets of oral Benadryl and still did not have any improvement.  She tried taking a cold shower.  The patient's symptoms continue to worsen and today at lunch rash had spread throughout her torso, bilateral arms and bilateral legs.  The patient did try contacting the allergist clinic yesterday, but they were unable to get her in until 9/16/2019.  She decided to return to the Emergency Department today for management of her symptoms.  The patient here  denies any difficulty breathing or sensation of her throat closing.  She does report that she had the beginnings of what felt like a sore throat earlier today, but this has since resolved.    I have reviewed the Medications, Allergies, Past Medical and Surgical History, and Social History in the Epic system.    Past Medical History:   Diagnosis Date     Autoimmune hypothyroidism 2005     Dysmenorrhea        Past Surgical History:   Procedure Laterality Date     NO HISTORY OF SURGERY         Family History   Problem Relation Age of Onset     Thyroid Disease Maternal Uncle      Diabetes Mother      Hypertension Maternal Uncle      Diabetes Father      Thyroid Disease Father      Other Cancer Father         glioblastoma, had surgery, 75 yo     Parkinsonism Father      Thyroid Disease Other      Hyperlipidemia Other      Cancer No family hx of        Social History     Tobacco Use     Smoking status: Never Smoker     Smokeless tobacco: Never Used   Substance Use Topics     Alcohol use: Yes     Alcohol/week: 3.0 oz     Comment: 1-2 drinks 3 days/week       Current Facility-Administered Medications   Medication     0.9% sodium chloride BOLUS     diphenhydrAMINE (BENADRYL) injection 25 mg     Current Outpatient Medications   Medication     Cholecalciferol (VITAMIN D) 2000 UNITS CAPS     Fexofenadine HCl (ALLEGRA PO)     letrozole (FEMARA) 2.5 MG tablet     norelgestromin-ethinyl estradiol (ORTHO EVRA) 150-35 MCG/24HR patch     predniSONE (DELTASONE) 20 MG tablet     ranitidine (ZANTAC) 150 MG tablet     SYNTHROID 75 MCG tablet     vitamin B complex with vitamin C (VITAMIN  B COMPLEX) TABS tablet        Allergies   Allergen Reactions     Seasonal Allergies Itching     Augmentin Nausea and Vomiting     Shellfish Allergy Hives         Review of Systems   HENT: Negative for trouble swallowing.    Respiratory: Negative for shortness of breath and wheezing.    Skin: Positive for rash (generalized with itch).   All other systems  reviewed and are negative.      Physical Exam   BP: (!) 147/80  Pulse: 133  Temp: 97.3  F (36.3  C)  Resp: 18  Weight: 69 kg (152 lb 3 oz)  SpO2: 100 %      Physical Exam   Constitutional: No distress.   HENT:   Head: Atraumatic.   Mouth/Throat: Oropharynx is clear and moist. No oropharyngeal exudate.   Eyes: Pupils are equal, round, and reactive to light. No scleral icterus.   Cardiovascular: Normal heart sounds and intact distal pulses.   Pulmonary/Chest: Breath sounds normal. No respiratory distress. She has no wheezes.   Abdominal: Soft. Bowel sounds are normal. There is no tenderness.   Musculoskeletal: She exhibits no edema or tenderness.   Skin: She is not diaphoretic.   Patient has diffuse urticarial pruritic rash on face trunk and extremities.       ED Course   2:08 PM  The patient was seen and examined by Hector Denny MD in Room ED05.        Procedures    Critical Care time:  none         Assessments & Plan (with Medical Decision Making)   I was able to contact the dermatology team and was able to fit the patient in with Dr. Lee today.    I have reviewed the nursing notes.    I have reviewed the findings, diagnosis, plan and need for follow up with the patient.  Patient with diffuse urticarial rash already being treated with cimetidine Benadryl and prednisone with no improvement.  I was able to discuss the case with the dermatology team and at this time they made arrangements for her to be seen by the dermatology/allergist at [facility].  Patient will return to the emergency room if she has any acute increase in symptoms otherwise is going directly to the dermatology clinic to see the allergist.     Medication List      There are no discharge medications for this visit.         Final diagnoses:   None     I, Gildardo Patel, am serving as a trained medical scribe to document services personally performed by Hector Denny MD, based on the provider's statements to me.   Hector BULLOCK MD, was  physically present and have reviewed and verified the accuracy of this note documented by Gildardo Patel.    7/29/2019   [facility], [place], EMERGENCY DEPARTMENT     Hector Denny MD  07/30/19 4113

## 2019-07-29 NOTE — NURSING NOTE
Allergy Rooming Note    Karishma Saucedo's goals for this visit include:   Chief Complaint   Patient presents with     Allergies     Karishma is here for an allergy consult related to possible food allergies.      Bere Graff LPN

## 2019-07-29 NOTE — PATIENT INSTRUCTIONS
We would like you to do the following for your skin:    -please take allegra 180 mg once in the morning and once in the afternoon   -please take Singulair 10 mg at night  -please take hydroxyzine 25 mg nightly to help with sleep  -we will take some labs today to evaluate for any underlying cause for the hives  -complete the 5 day 40 mg daily course of prednisone  -please inhale two puffs two times daily of the inhaler we prescribed    We should see you back in one week to see how things are progressing and possibly start a medicine called omalizumab +/- schedule allergy testing

## 2019-07-29 NOTE — PROGRESS NOTES
[facility] Allergy Note      Allergy Problem List:  1. History of urticaria with significant and recent flaring  -rx: medrol dose pack (40 mg x 5 days), daily benadryl/allegra, montelukast 10 mg at bedtime, Symbicort inhaler  -future: repeat allergy testing +/- omalizumab    Encounter Date: Jul 29, 2019    CC:   Chief Complaint   Patient presents with     Allergies     Karishma is here for an allergy consult related to possible food allergies.      History of Present Illness:  Ms. Karishma Saucedo is a 44 year old female who presents in self referral/ED follow up for diffuse urticaria. The patient has a history of hypothyroidism, seasonal allergies, and urticaria. She has a longstanding history of urticaria which was very prominent when she was a childhood. She had since developed several food allergies including shell fish and several other food allergens which was verified at one point with intradermal prick testing (per patient). She had been controlling this with Allegra for nearly two decades. She notes that last Wednesday, she had acute attack of hives throughout her body while getting ready to go to bed. She doubled her Allegra dose that night with relief. Then Saturday, she again had worsening of hives and this time the Allegra did not help. She went to ED on Sunday and was given ranitidine, benadryl and 60mg prednisone with relief though roughly 6 hours later it returned. Other than hives throughout her body, she's noticed tingling around her mouth as well as dry cough. She's been managing all of this with benadryl, prednisone and allegra though is worried she will be unable to keep up with current medication regimen. She denies new soaps, detergents, pet or animal exposures, recent travel. She's not had any changes in medicine nor changes in formulation of crrent meds.    Past Medical History:   Patient Active Problem List   Diagnosis     Seasonal allergies     Hypothyroidism, Dx 2004      Hair loss     Contraception     Thyrotoxicosis, exogenous iatrogenic     Endometriosis     Effusion of lower leg joint     Past Medical History:   Diagnosis Date     Autoimmune hypothyroidism 2005     Dysmenorrhea      Past Surgical History:   Procedure Laterality Date     NO HISTORY OF SURGERY         Social History:  Patient reports that she has never smoked. She has never used smokeless tobacco. She reports that she drinks about 3.0 oz of alcohol per week. She reports that she does not use drugs.    Family History:  Family History   Problem Relation Age of Onset     Thyroid Disease Maternal Uncle      Diabetes Mother      Hypertension Maternal Uncle      Diabetes Father      Thyroid Disease Father      Other Cancer Father         glioblastoma, had surgery, 75 yo     Parkinsonism Father      Thyroid Disease Other      Hyperlipidemia Other      Cancer No family hx of        Medications:  Current Outpatient Medications   Medication Sig Dispense Refill     budesonide-formoterol (SYMBICORT) 160-4.5 MCG/ACT Inhaler Inhale 2 puffs into the lungs 2 times daily 10.2 g 3     montelukast (SINGULAIR) 10 MG tablet Take 1 tablet (10 mg) by mouth At Bedtime 60 tablet 3     Cholecalciferol (VITAMIN D) 2000 UNITS CAPS Take 2 tablets by mouth daily       Fexofenadine HCl (ALLEGRA PO) Take 30 mg by mouth daily       letrozole (FEMARA) 2.5 MG tablet Take 1 tablet (2.5 mg) by mouth daily 90 tablet 3     norelgestromin-ethinyl estradiol (ORTHO EVRA) 150-35 MCG/24HR patch Continuous use, no weeks off 12 patch 3     predniSONE (DELTASONE) 20 MG tablet Take two tablets (= 40mg) each day for 5 (five) days 10 tablet 0     ranitidine (ZANTAC) 150 MG tablet Take 1 tablet (150 mg) by mouth 2 times daily for 10 days 20 tablet 0     SYNTHROID 75 MCG tablet MON to SAT 1 tablet/day; SUN 0.5 tablet 90 tablet 3     vitamin B complex with vitamin C (VITAMIN  B COMPLEX) TABS tablet Take 1 tablet by mouth daily          Allergies   Allergen Reactions      Seasonal Allergies Itching     Augmentin Nausea and Vomiting     Shellfish Allergy Hives         Review of Systems:  -Constitutional: Fatigue  -Pulm: dry cough  -HEENT: Congestion, dry cough.  -Skin: As above in HPI. No additional skin concerns.    Physical exam:  Vitals: There were no vitals taken for this visit.  GEN: This is a well developed, well-nourished female in no acute distress, in a pleasant mood.  HEENT: non-injected sclera, boggy nasal turbinates, no injection of oropharynx or mucosal edema.  PULM: No wheezing, clear to auscultation, frequent dry cough  SKIN: no current evidence of wheals/hives    Impression/Plan:  1. Atopic predisposition with seasonal rhinoconjunctivitis and food allergies: longstanding atopy since childhood well controlled for two decades on anti-histamine (Allegra) with worsening in past week associated with dry cough and possible reactive airway component. Etiology could be related to a number of allergens without clear cause.     Start Allegra 180mg twice daily    Hydroxyzine 25mg at bedtime    Symbicort BID    Montelukast 10mg at bedtime    Complete 5 day course of 40mg prednisone    Labs today including CBC w/ diff, CMP, Total IgE, Anti IgE, MEHDI, CRP    Spirometry today    Return to clinic in 1 week    Evaluate in future the use of Xolair (can test under this medication)    2. Chronic urticaria, significant with recent flaring     See above plan    Plan in future allergy prick tests with atopy screen prick test and food allergens to check on pollen allergy (that could explain the asthma) and if any food allergy could be behind the chronic Urticaria    CC Referred Self, MD  No address on file on close of this encounter.  Follow-up 1 week      Staff Physician Comments:  I saw and evaluated the patient with the resident and I agree with the assessment and plan as documented in the resident's note.    Claude Lee MD  Professor  Head of Dermato-Allergy Division  Department  of Dermatology  [facility]      Staff Involved:  Resident(Kiet Hannon)/Staff(Dr. Lee)    Staff Physician Comments:  I saw and evaluated the patient with the resident and I agree with the assessment and plan as documented in the resident's note.    Claude Lee MD  Professor  Head of Dermato-Allergy Division  Department of Dermatology  [facility]    I spent a total of 35 minutes face to face with Karishma Saucedo during today s office visit. Over 50% of this time was spent counseling the patient and/or coordinating care. Please see Assessment and Plan for details.

## 2019-07-29 NOTE — ED AVS SNAPSHOT
[facility], [place], Emergency Department  [address]  [facility] [address]  Phone:  [phone]  Fax:  [phone]                                    Karishma Saucedo   MRN: [id number]    Department:  [facility], Emergency Department   Date of Visit:  7/29/2019           After Visit Summary Signature Page    I have received my discharge instructions, and my questions have been answered. I have discussed any challenges I see with this plan with the nurse or doctor.    ..........................................................................................................................................  Patient/Patient Representative Signature      ..........................................................................................................................................  Patient Representative Print Name and Relationship to Patient    ..................................................               ................................................  Date                                   Time    ..........................................................................................................................................  Reviewed by Signature/Title    ...................................................              ..............................................  Date                                               Time          22EPIC Rev 08/18

## 2019-07-30 ENCOUNTER — TELEPHONE (OUTPATIENT)
Dept: ALLERGY | Facility: CLINIC | Age: 44
End: 2019-07-30

## 2019-07-30 DIAGNOSIS — J45.909 REACTIVE AIRWAY DISEASE: Primary | ICD-10-CM

## 2019-07-30 NOTE — TELEPHONE ENCOUNTER
You could add her on 12 pm Thursday or offer next available with me.   Alternatively, maybe Priscila or Marva or any other provider?  Thanks,  Meche Cats MD  Internal Medicine

## 2019-07-30 NOTE — TELEPHONE ENCOUNTER
Health Call Center    Phone Message    May a detailed message be left on voicemail: yes    Reason for Call: Requesting Results    Patient stated she is returning a missed call from the allergy clinic in regard to lab results. Called after clinic hours. Please advise.         Action Taken: Message routed to:  [facility] ([facility] Allergy

## 2019-07-31 LAB — IGE SERPL-ACNC: 108 KIU/L (ref 0–114)

## 2019-08-01 LAB
DLCOCOR-%PRED-PRE: 101 %
DLCOCOR-PRE: 20.32 ML/MIN/MMHG
DLCOUNC-%PRED-PRE: 98 %
DLCOUNC-PRE: 19.8 ML/MIN/MMHG
DLCOUNC-PRED: 20.09 ML/MIN/MMHG
ERV-%PRED-PRE: 50 %
ERV-PRE: 0.42 L
ERV-PRED: 0.84 L
EXPTIME-PRE: 6.06 SEC
FEF2575-%PRED-PRE: 164 %
FEF2575-PRE: 4.53 L/SEC
FEF2575-PRED: 2.76 L/SEC
FEFMAX-%PRED-PRE: 124 %
FEFMAX-PRE: 8.24 L/SEC
FEFMAX-PRED: 6.6 L/SEC
FEV1-%PRED-PRE: 100 %
FEV1-PRE: 2.56 L
FEV1FEV6-PRE: 91 %
FEV1FEV6-PRED: 83 %
FEV1FVC-PRE: 91 %
FEV1FVC-PRED: 83 %
FEV1SVC-PRE: 88 %
FEV1SVC-PRED: 76 %
FIFMAX-PRE: 4.85 L/SEC
FRCPLETH-%PRED-PRE: 71 %
FRCPLETH-PRE: 1.83 L
FRCPLETH-PRED: 2.56 L
FVC-%PRED-PRE: 90 %
FVC-PRE: 2.8 L
FVC-PRED: 3.08 L
IC-%PRED-PRE: 99 %
IC-PRE: 2.5 L
IC-PRED: 2.52 L
RVPLETH-%PRED-PRE: 91 %
RVPLETH-PRE: 1.41 L
RVPLETH-PRED: 1.53 L
TLCPLETH-%PRED-PRE: 94 %
TLCPLETH-PRE: 4.32 L
TLCPLETH-PRED: 4.58 L
VA-%PRED-PRE: 84 %
VA-PRE: 3.82 L
VC-%PRED-PRE: 86 %
VC-PRE: 2.92 L
VC-PRED: 3.35 L

## 2019-08-01 NOTE — TELEPHONE ENCOUNTER
I spoke to Karishma Saucedo and gave results. Patient understood and had no further questions or concerns.

## 2019-08-06 ENCOUNTER — OFFICE VISIT (OUTPATIENT)
Dept: ALLERGY | Facility: CLINIC | Age: 44
End: 2019-08-06
Payer: COMMERCIAL

## 2019-08-06 DIAGNOSIS — L50.8 CHRONIC URTICARIA: ICD-10-CM

## 2019-08-06 ASSESSMENT — PAIN SCALES - GENERAL: PAINLEVEL: NO PAIN (0)

## 2019-08-06 NOTE — LETTER
8/6/2019         RE: Karishma Saucedo  [address]  [facility] [address]        Dear Colleague,    Thank you for referring your patient, Karishma Saucedo, to the [facility] ALLERGY. Please see a copy of my visit note below.    [facility] Allergy Note        Allergy Problem List:  1. History of urticaria with significant and recent flaring  -rx: daily benadryl/allegra, montelukast 10 mg at bedtime, Symbicort inhaler  -future: repeat allergy testing +/- omalizumab     Encounter Date: Jul 29, 2019     CC:        Chief Complaint   Patient presents with     Allergies       Karishma is here for an allergy consult related to possible food allergies.       History of Present Illness:  Ms. Karishma Saucedo is a 44 year old female who presents in self referral/ED follow up for diffuse urticaria. The patient has a history of hypothyroidism, seasonal allergies, and urticaria. She has a longstanding history of urticaria which was very prominent when she was a child. She has since developed several food allergies including shellfish and several other food allergens which was verified at one point with intradermal prick testing (per patient). She had been controlling this with Allegra for nearly two decades. Since last visit she completed 5 day course of 40 mg prednisone on Saturday, her hives recurred diffusely on Sunday. While taking prednisone, one or two hives would pop up but much more mild than it was previously. Has been trying to compensate by taking increased amounts of allergy medication, has been taking Allegra 180 mg three times per day and hydroxyzine 25 mg three times per day, with benefit to hives. Continuing to take Symbicort BID and montelukast every day. Notes dry cough is resolved since last visit. She denies new soaps, detergents, pet or animal exposures, recent travel. She's not had any changes in medicine nor changes in formulation of current meds.        Past Medical  History:       Patient Active Problem List   Diagnosis     Seasonal allergies     Hypothyroidism, Dx 2004     Hair loss     Contraception     Thyrotoxicosis, exogenous iatrogenic     Endometriosis     Effusion of lower leg joint      Past Medical History        Past Medical History:   Diagnosis Date     Autoimmune hypothyroidism 2005     Dysmenorrhea           Past Surgical History         Past Surgical History:   Procedure Laterality Date     NO HISTORY OF SURGERY                Social History:  Patient reports that she has never smoked. She has never used smokeless tobacco. She reports that she drinks about 3.0 oz of alcohol per week. She reports that she does not use drugs.     Family History:  Family History         Family History   Problem Relation Age of Onset     Thyroid Disease Maternal Uncle       Diabetes Mother       Hypertension Maternal Uncle       Diabetes Father       Thyroid Disease Father       Other Cancer Father           glioblastoma, had surgery, 75 yo     Parkinsonism Father       Thyroid Disease Other       Hyperlipidemia Other       Cancer No family hx of              Medications:  Current Outpatient Prescriptions          Current Outpatient Medications   Medication Sig Dispense Refill     budesonide-formoterol (SYMBICORT) 160-4.5 MCG/ACT Inhaler Inhale 2 puffs into the lungs 2 times daily 10.2 g 3     montelukast (SINGULAIR) 10 MG tablet Take 1 tablet (10 mg) by mouth At Bedtime 60 tablet 3     Cholecalciferol (VITAMIN D) 2000 UNITS CAPS Take 2 tablets by mouth daily         Fexofenadine HCl (ALLEGRA PO) Take 30 mg by mouth daily         letrozole (FEMARA) 2.5 MG tablet Take 1 tablet (2.5 mg) by mouth daily 90 tablet 3     norelgestromin-ethinyl estradiol (ORTHO EVRA) 150-35 MCG/24HR patch Continuous use, no weeks off 12 patch 3     predniSONE (DELTASONE) 20 MG tablet Take two tablets (= 40mg) each day for 5 (five) days 10 tablet 0     ranitidine (ZANTAC) 150 MG tablet Take 1 tablet (150 mg)  by mouth 2 times daily for 10 days 20 tablet 0     SYNTHROID 75 MCG tablet MON to SAT 1 tablet/day; SUN 0.5 tablet 90 tablet 3     vitamin B complex with vitamin C (VITAMIN  B COMPLEX) TABS tablet Take 1 tablet by mouth daily                                Allergies   Allergen Reactions     Seasonal Allergies Itching     Augmentin Nausea and Vomiting     Shellfish Allergy Hives            Review of Systems:  -Constitutional: Fatigue  -Skin: As above in HPI. No additional skin concerns.     Physical exam:  Vitals: There were no vitals taken for this visit.  GEN: This is a well developed, well-nourished female in no acute distress, in a pleasant mood.  HEENT: non-injected sclera, no injection of oropharynx or mucosal edema.  PULM: No wheezing, clear to auscultation  SKIN: no current evidence of wheals/hives     Impression/Plan:  1. Atopic predisposition with seasonal rhinoconjunctivitis and food allergies: longstanding atopy since childhood well controlled for two decades on anti-histamine (Allegra) with worsening in past two weeks. Due to associated dry cough suspected it could have a possible reactive airway component, however dry cough is now resolved and PFTs on 7/29/19 were normal. Etiology could be related to a number of allergens without clear cause.  Thyroid function testing from [facility] on 8/1/19 and 4/22/19 show TSH of 11.7 and 11.1 respectively and TSH from 7/29/19 completed here had undetectable TSH. Urticaria could be linked to an autoimmune thyroid problem, however still need a clearer picture of her thyroid disease after contradictory lab values. Autoimmune thyroid disease would also not explain her leukocytosis and neutrophilia.      Continue Allegra 180mg twice daily    Continue Hydroxyzine 25mg at bedtime    Continue Symbicort BID    Continue Montelukast 10mg at bedtime    Completed prior authorization for Xolair, begin 1 injection per month    Labs on 7/29/19      CBC w/ diff on 7/29/19 showing  leukocytosis of 19.5 and neutrophilia    CMP, Total IgE, Anti IgE, MEHDI, CRP on 7/29/19 all normal    Anti IgE from  is pending     Thyroid function testing (4/22/19 and 5/2/19): TSH not measurable,TPO antibodies are elevated, T3 is normal    Spirometry normal on 7/30/19    At next visit will check labs for CBC, ESR, MEHDI, CRP, HbA1c, and urine SPEP    Follow up in 1 month     2. Chronic urticaria, significant with recent flaring     See above plan     Plan in future allergy prick tests with atopy screen prick test and food allergens to check on pollen allergy (that could explain the asthma) and if any food allergy could be behind the chronic urticaria    Follow up in 1 month      Staff Involved:       Staff Physician Comments:  I saw and evaluated the patient with the resident and I agree with the assessment and plan as documented in the resident's note.     Claude Lee MD  Professor  Head of Dermato-Allergy Division  Department of Dermatology  [facility], [place], [facility]      Again, thank you for allowing me to participate in the care of your patient.        Sincerely,        Claude Lee MD

## 2019-08-06 NOTE — NURSING NOTE
Allergy Rooming Note    Karishma Saucedo's goals for this visit include:   Chief Complaint   Patient presents with     Allergy Testing Followup     Karishma is here for an allergy follow up regarding possible testing.      Bere Graff LPN     Patient Seen in: [address] [facility]    History   Patient presents with:  Sore Throat    Stated Complaint: Sore Throat    HPI    Patient here with sore throat for 10 days. Was recently in [facility], positive sick contacts .   [place] NAD  HEAD: normocephalic, atraumatic  EYES: sclera non icteric bilateral, conjunctiva clear  EARS:TM's clear bilateral  NOSE: nl  THROAT: moderate erythema, no exudates  LUNGS: clear, no resp distress, lungs clear bilateral  SKIN: good skin turgor, no obvi

## 2019-08-06 NOTE — PROGRESS NOTES
[facility] Allergy Note        Allergy Problem List:  1. History of urticaria with significant and recent flaring  -rx: daily benadryl/allegra, montelukast 10 mg at bedtime, Symbicort inhaler  -future: repeat allergy testing +/- omalizumab     Encounter Date: Jul 29, 2019     CC:        Chief Complaint   Patient presents with     Allergies       Karishma is here for an allergy consult related to possible food allergies.       History of Present Illness:  Ms. Karishma Saucedo is a 44 year old female who presents in self referral/ED follow up for diffuse urticaria. The patient has a history of hypothyroidism, seasonal allergies, and urticaria. She has a longstanding history of urticaria which was very prominent when she was a child. She has since developed several food allergies including shellfish and several other food allergens which was verified at one point with intradermal prick testing (per patient). She had been controlling this with Allegra for nearly two decades. Since last visit she completed 5 day course of 40 mg prednisone on Saturday, her hives recurred diffusely on Sunday. While taking prednisone, one or two hives would pop up but much more mild than it was previously. Has been trying to compensate by taking increased amounts of allergy medication, has been taking Allegra 180 mg three times per day and hydroxyzine 25 mg three times per day, with benefit to hives. Continuing to take Symbicort BID and montelukast every day. Notes dry cough is resolved since last visit. She denies new soaps, detergents, pet or animal exposures, recent travel. She's not had any changes in medicine nor changes in formulation of current meds.        Past Medical History:       Patient Active Problem List   Diagnosis     Seasonal allergies     Hypothyroidism, Dx 2004     Hair loss     Contraception     Thyrotoxicosis, exogenous iatrogenic     Endometriosis     Effusion of lower leg joint       Past Medical History        Past Medical History:   Diagnosis Date     Autoimmune hypothyroidism 2005     Dysmenorrhea           Past Surgical History         Past Surgical History:   Procedure Laterality Date     NO HISTORY OF SURGERY                Social History:  Patient reports that she has never smoked. She has never used smokeless tobacco. She reports that she drinks about 3.0 oz of alcohol per week. She reports that she does not use drugs.     Family History:  Family History   Family History   Problem Relation Age of Onset     Thyroid Disease Maternal Uncle       Diabetes Mother       Hypertension Maternal Uncle       Diabetes Father       Thyroid Disease Father       Other Cancer Father           glioblastoma, had surgery, 75 yo     Parkinsonism Father       Thyroid Disease Other       Hyperlipidemia Other       Cancer No family hx of              Medications:  Current Outpatient Prescriptions          Current Outpatient Medications   Medication Sig Dispense Refill     budesonide-formoterol (SYMBICORT) 160-4.5 MCG/ACT Inhaler Inhale 2 puffs into the lungs 2 times daily 10.2 g 3     montelukast (SINGULAIR) 10 MG tablet Take 1 tablet (10 mg) by mouth At Bedtime 60 tablet 3     Cholecalciferol (VITAMIN D) 2000 UNITS CAPS Take 2 tablets by mouth daily         Fexofenadine HCl (ALLEGRA PO) Take 30 mg by mouth daily         letrozole (FEMARA) 2.5 MG tablet Take 1 tablet (2.5 mg) by mouth daily 90 tablet 3     norelgestromin-ethinyl estradiol (ORTHO EVRA) 150-35 MCG/24HR patch Continuous use, no weeks off 12 patch 3     predniSONE (DELTASONE) 20 MG tablet Take two tablets (= 40mg) each day for 5 (five) days 10 tablet 0     ranitidine (ZANTAC) 150 MG tablet Take 1 tablet (150 mg) by mouth 2 times daily for 10 days 20 tablet 0     SYNTHROID 75 MCG tablet MON to SAT 1 tablet/day; SUN 0.5 tablet 90 tablet 3     vitamin B complex with vitamin C (VITAMIN  B COMPLEX) TABS tablet Take 1 tablet by mouth daily                                 Allergies   Allergen Reactions     Seasonal Allergies Itching     Augmentin Nausea and Vomiting     Shellfish Allergy Hives            Review of Systems:  -Constitutional: Fatigue  -Skin: As above in HPI. No additional skin concerns.     Physical exam:  Vitals: There were no vitals taken for this visit.  GEN: This is a well developed, well-nourished female in no acute distress, in a pleasant mood.  HEENT: non-injected sclera, no injection of oropharynx or mucosal edema.  PULM: No wheezing, clear to auscultation  SKIN: no current evidence of wheals/hives     Impression/Plan:  1. Atopic predisposition with seasonal rhinoconjunctivitis and food allergies: longstanding atopy since childhood well controlled for two decades on anti-histamine (Allegra) with worsening in past two weeks. Due to associated dry cough suspected it could have a possible reactive airway component, however dry cough is now resolved and PFTs on 7/29/19 were normal. Etiology could be related to a number of allergens without clear cause. Thyroid function testing from [facility] on 8/1/19 and 4/22/19 show TSH of 11.7 and 11.1 respectively and TSH from 7/29/19 completed here had undetectable TSH. Urticaria could be linked to an autoimmune thyroid problem, however still need a clearer picture of her thyroid disease after contradictory lab values. Autoimmune thyroid disease would also not explain her leukocytosis and neutrophilia.      Continue Allegra 180mg twice daily    Continue Hydroxyzine 25mg at bedtime    Continue Symbicort BID    Continue Montelukast 10mg at bedtime    Completed prior authorization for Xolair, begin 1 injection per month    Labs on 7/29/19      CBC w/ diff on 7/29/19 showing leukocytosis of 19.5 and neutrophilia    CMP, Total IgE, Anti IgE, MEHDI, CRP on 7/29/19 all normal    Anti IgE antibodies elevated!     Thyroid function testing (4/22/19 and 5/2/19): TSH not measurable,TPO antibodies are elevated, T3  is normal    Spirometry normal on 7/30/19    At next visit will check labs for CBC, ESR, MEHDI, CRP, HbA1c, and urine SPEP    Follow up in 1 month     2. Chronic urticaria, significant with recent flaring     See above plan     Plan in future allergy prick tests with atopy screen prick test and food allergens to check on pollen allergy (that could explain the asthma) and if any food allergy could be behind the chronic urticaria    Follow up in 1 month      Staff Involved:       Staff Physician Comments:  I saw and evaluated the patient with the resident and I agree with the assessment and plan as documented in the resident's note.     Claude Lee MD  Professor  Head of Dermato-Allergy Division  Department of Dermatology  [facility]

## 2019-08-07 ENCOUNTER — TELEPHONE (OUTPATIENT)
Dept: DERMATOLOGY | Facility: CLINIC | Age: 44
End: 2019-08-07

## 2019-08-07 DIAGNOSIS — L50.8 CHRONIC URTICARIA: Primary | ICD-10-CM

## 2019-08-07 NOTE — TELEPHONE ENCOUNTER
[facility] Call Center    Phone Message    May a detailed message be left on voicemail: yes    Reason for Call: Other: Pt  scheduled a 1 month f/u with Jesus on 09/06. Pt would like labs to be placed prior so that she can complete it before the appt.     Pt also has a question about a Lab that pt completed for 'thyroid' that Jesus said was high. Pt would like to have lab result to be sent to pt via [employer].     Action Taken: Message routed to:  [facility] allergy

## 2019-08-08 LAB — ANTI IGE ANTIBODY: ABNORMAL

## 2019-09-06 ENCOUNTER — OFFICE VISIT (OUTPATIENT)
Dept: ALLERGY | Facility: CLINIC | Age: 44
End: 2019-09-06
Payer: COMMERCIAL

## 2019-09-06 ENCOUNTER — APPOINTMENT (OUTPATIENT)
Dept: LAB | Facility: CLINIC | Age: 44
End: 2019-09-06
Payer: COMMERCIAL

## 2019-09-06 DIAGNOSIS — L50.8 CHRONIC URTICARIA: Primary | ICD-10-CM

## 2019-09-06 DIAGNOSIS — J45.909 REACTIVE AIRWAY DISEASE WITHOUT COMPLICATION, UNSPECIFIED ASTHMA SEVERITY, UNSPECIFIED WHETHER PERSISTENT: ICD-10-CM

## 2019-09-06 LAB
BASOPHILS # BLD AUTO: 0 10E9/L (ref 0–0.2)
BASOPHILS NFR BLD AUTO: 0.1 %
DIFFERENTIAL METHOD BLD: ABNORMAL
EOSINOPHIL # BLD AUTO: 0.3 10E9/L (ref 0–0.7)
EOSINOPHIL NFR BLD AUTO: 2.9 %
ERYTHROCYTE [DISTWIDTH] IN BLOOD BY AUTOMATED COUNT: 12.6 % (ref 10–15)
HCT VFR BLD AUTO: 41.6 % (ref 35–47)
HGB BLD-MCNC: 13 G/DL (ref 11.7–15.7)
IMM GRANULOCYTES # BLD: 0 10E9/L (ref 0–0.4)
IMM GRANULOCYTES NFR BLD: 0.2 %
LYMPHOCYTES # BLD AUTO: 3.5 10E9/L (ref 0.8–5.3)
LYMPHOCYTES NFR BLD AUTO: 37.3 %
MCH RBC QN AUTO: 28.4 PG (ref 26.5–33)
MCHC RBC AUTO-ENTMCNC: 31.3 G/DL (ref 31.5–36.5)
MCV RBC AUTO: 91 FL (ref 78–100)
MONOCYTES # BLD AUTO: 0.7 10E9/L (ref 0–1.3)
MONOCYTES NFR BLD AUTO: 7.4 %
NEUTROPHILS # BLD AUTO: 4.8 10E9/L (ref 1.6–8.3)
NEUTROPHILS NFR BLD AUTO: 52.1 %
NRBC # BLD AUTO: 0 10*3/UL
NRBC BLD AUTO-RTO: 0 /100
PLATELET # BLD AUTO: 334 10E9/L (ref 150–450)
RBC # BLD AUTO: 4.58 10E12/L (ref 3.8–5.2)
WBC # BLD AUTO: 9.3 10E9/L (ref 4–11)

## 2019-09-06 RX ORDER — BUDESONIDE AND FORMOTEROL FUMARATE DIHYDRATE 160; 4.5 UG/1; UG/1
2 AEROSOL RESPIRATORY (INHALATION) DAILY
Qty: 1 INHALER | Refills: 3 | Status: SHIPPED | OUTPATIENT
Start: 2019-09-06 | End: 2019-11-05

## 2019-09-06 ASSESSMENT — PAIN SCALES - GENERAL: PAINLEVEL: NO PAIN (0)

## 2019-09-06 NOTE — NURSING NOTE
Chief Complaint   Patient presents with     Hives     Karishma is here today following up on hive breakout. She states currently she is doing fine.      Iman Lewis LPN

## 2019-09-06 NOTE — PROGRESS NOTES
[facility] Allergy Note      Dermatology Surgery Clinic  [facility]  [address] [place] [id number]    Allergy Problem List:  1. History of urticaria with significant and recent flaring  -rx: daily benadryl/allegra, montelukast 10 mg at bedtime (in reserve), Symbicort inhaler 1-2x daily  -previous tx: hydroxyzine 25 mg (stopped 9/6/19)  -future: repeat allergy testing +/- omalizumab  -atopic prick test negative 9/6/19     Encounter Date: Jul 29, 2019     CC:        Chief Complaint   Patient presents with     Allergies       Karishma is here for an allergy consult related to possible food allergies.       History of Present Illness:  Ms. Karishma Saucedo is a 44 year old female who presents in self referral/ED follow up for diffuse urticaria. The patient has a history of hypothyroidism, seasonal allergies, and urticaria. She has a longstanding history of urticaria which was very prominent when she was a child. She has since developed several food allergies including shellfish and several other food allergens which was verified at one point with intradermal prick testing (per patient). She had been controlling this with Allegra for nearly two decades. Since last visit she completed 5 day course of 40 mg prednisone on Saturday, her hives recurred diffusely on Sunday. While taking prednisone, one or two hives would pop up but much more mild than it was previously. Has been trying to compensate by taking increased amounts of allergy medication, has been taking Allegra 180 mg three times per day and hydroxyzine 25 mg three times per day, with benefit to hives. Continuing to take Symbicort BID and montelukast every day. Notes dry cough is resolved since last visit. She denies new soaps, detergents, pet or animal exposures, recent travel. She's not had any changes in medicine nor changes in formulation of current meds.    Hx since 08/06/19:  The  "patient's Xolair wasn't approved.   She was taking Allegra BID and hydroxyzine.  Today the patient states that she hasn't taken an allegra today as she wanted to see how she would do. She has been taking Allegra once daily with no hives. She has stopped the hydroxyzine.    With regards to the hydroxyzine, about a week after she saw unit(s) 8/6/19, she reports that her symptoms subsided. She began taking the hydroxyzine every other day and eventually completely went off of it.   She began taking the montelukast intermittently but hasn't completely come off of it yet. Her last dose was a couple of nights ago.    She shares that if she doesn't take her Symbicort, which she was to take BID, she begins to have an \"underlying, uncomfortable itch.\"    Today the patient shares that she would like an allergy test. She last had an allergy test in college.        Past Medical History:       Patient Active Problem List   Diagnosis     Seasonal allergies     Hypothyroidism, Dx 2004     Hair loss     Contraception     Thyrotoxicosis, exogenous iatrogenic     Endometriosis     Effusion of lower leg joint      Past Medical History        Past Medical History:   Diagnosis Date     Autoimmune hypothyroidism 2005     Dysmenorrhea           Past Surgical History         Past Surgical History:   Procedure Laterality Date     NO HISTORY OF SURGERY                Social History:  Patient reports that she has never smoked. She has never used smokeless tobacco. She reports that she drinks about 3.0 oz of alcohol per week. She reports that she does not use drugs.     Family History:  Family History         Family History   Problem Relation Age of Onset     Thyroid Disease Maternal Uncle       Diabetes Mother       Hypertension Maternal Uncle       Diabetes Father       Thyroid Disease Father       Other Cancer Father           glioblastoma, had surgery, 75 yo     Parkinsonism Father       Thyroid Disease Other       Hyperlipidemia Other   "     Cancer No family hx of              Medications:  Current Outpatient Prescriptions          Current Outpatient Medications   Medication Sig Dispense Refill     budesonide-formoterol (SYMBICORT) 160-4.5 MCG/ACT Inhaler Inhale 2 puffs into the lungs 2 times daily 10.2 g 3     montelukast (SINGULAIR) 10 MG tablet Take 1 tablet (10 mg) by mouth At Bedtime 60 tablet 3     Cholecalciferol (VITAMIN D) 2000 UNITS CAPS Take 2 tablets by mouth daily         Fexofenadine HCl (ALLEGRA PO) Take 30 mg by mouth daily         letrozole (FEMARA) 2.5 MG tablet Take 1 tablet (2.5 mg) by mouth daily 90 tablet 3     norelgestromin-ethinyl estradiol (ORTHO EVRA) 150-35 MCG/24HR patch Continuous use, no weeks off 12 patch 3     predniSONE (DELTASONE) 20 MG tablet Take two tablets (= 40mg) each day for 5 (five) days 10 tablet 0     ranitidine (ZANTAC) 150 MG tablet Take 1 tablet (150 mg) by mouth 2 times daily for 10 days 20 tablet 0     SYNTHROID 75 MCG tablet MON to SAT 1 tablet/day; SUN 0.5 tablet 90 tablet 3     vitamin B complex with vitamin C (VITAMIN  B COMPLEX) TABS tablet Take 1 tablet by mouth daily                                Allergies   Allergen Reactions     Seasonal Allergies Itching     Augmentin Nausea and Vomiting     Shellfish Allergy Hives          Review of Systems:  -Constitutional: Fatigue  -Skin: As above in HPI. No additional skin concerns.     Physical exam:  Vitals: There were no vitals taken for this visit.  GEN: This is a well developed, well-nourished female in no acute distress, in a pleasant mood.  HEENT: non-injected sclera, no injection of oropharynx or mucosal edema.  PULM: No wheezing, clear to auscultation  SKIN: no current evidence of wheals/hives     Atopy Screen (Placed 09/06/19 )    No Substance Readings (15 min) Evaluation   POS Histamine 1mg/ml ++    NEG NaCl 0.9% -+ dermographism     No Substance Readings (15 min) Evaluation   1 Alternaria alternata (tenuis)  -    2 Cladosporium herbarum -     3 Aspergillus fumigatus -    4 Penicillium notatum -    5 Dermatophagoides pteronyssinus -    6 Dermatophagoides farinae -    7 Dog epithelium (canis spp) -    8 Cat hair (sawyer catus) -    9 Cockroach   (Blatella americana & germanica) -    10 Grass mix midwest   (Lisbeth, Orchard, Redtop, Blaise) -    11 Roldan grass (sorghum halepense) -    12 Weed mix   (common Cocklebur, Lamb s quarters, rough redroot Pigweed, Dock/Sorrel) -    13 Mug wort (artemisia vulgare) -    14 Ragweed giant/short (ambrosia spp) -    15 English Plantain (plantago lanceolata) -    16 Tree mix 1 (Pecan, Maple BHR, Oak RVW, american [place], black [place]) -    17 Red cedar (juniperus virginia) -    18 Tree mix 2   (white Jani, river/red Birch, black [place], common [place], american Elm) -    19 Box elder/Maple mix (acer spp) -    20 [place] shagbark (carya ovata) -       -      Conclusion:  All prick tests negatives and no signs for atopic predisposition.    Impression/Plan:  1) chronic Urticaria with possible underlying autoimmune component or inflammatory process    Clear improvement of the Urticaria in the moment with no symptoms on Allegra    in 07/29/2019 Leucocytosis and Neutrophilia, that has now resolved 09/06/2019 (with reduction of Urticaria symptoms)    Thyroid function testing from [facility] on 8/1/19 and 4/22/19 show TSH of 11.7 and 11.1 respectively and TSH from 7/29/19 completed here had undetectable TSH. Urticaria could be linked to an autoimmune thyroid problem, however still need a clearer picture of her thyroid disease after contradictory lab values. Autoimmune thyroid disease would also not explain her leukocytosis and neutrophilia.    Labs on 7/29/19      CBC w/ diff on 7/29/19 showing leukocytosis of 19.5 and neutrophilia --> as expected with reduction of Urticaria as well no Leucocytosis on 09/06/2019!    CMP, Total IgE, Anti IgE, MEHDI, CRP on 7/29/19 all normal    Anti IgE antibodies elevated!     Thyroid  function testing (4/22/19 and 5/2/19): TSH not measurable,TPO antibodies are elevated, T3 is normal    2) recurrent rhinitis and coughing (allergic? or irritant?)    Due to associated dry cough suspected it could have a  possible reactive airway component, however dry cough is  now resolved and PFTs on 7/29/19 were normal. Etiology  could be related to a number of allergens without clear cause    Atopy screen prick test negative!!    No signs for clear atopic predisposition    Spirometry normal on 7/30/19    3) food allergies?:      Procedure/Treatment    Allegra 180mg twice daily; the patient is trying to wean herself off of this     STOP Hydroxyzine 25mg at bedtime    Continue Symbicort every day. Refills provided today.    Continue Montelukast 10mg at bedtime in reserve (try to stop the Montelukast)    Completed prior authorization for Xolair, but the patient wasn't approved as of 9/6/19 (however, now Urticaria much less active)    Pt has not had any allergy tests since college and would like to have some done. 9/6/19 performed an atopic prick test    We will do the food panel in 2 months for egg, wheat, and seafood. She feels that she has a sensitivity to codfish.    The patient should be off of all antihistamines for 3 days prior the food panel.    Follow up in 2 months       Staff Involved:    Scribe Disclosure  I, Zunilda Sherman, am serving as a scribe to document services personally performed by Dr. Claude Lee, based on data collection and the provider's statements to me.     I spent a total of 25 min face to face with Lewis Schwarz during today's office visit. About 50% of the time was spent counseling the patient and/or coordinating care regarding their allergy.

## 2019-09-06 NOTE — LETTER
9/6/2019         RE: Karishma Saucedo  [address]  [facility] [address]        Dear Colleague,    Thank you for referring your patient, Karishma Saucedo, to the [facility] ALLERGY. Please see a copy of my visit note below.    [facility] Allergy Note      Dermatology Surgery Clinic  [facility]  [facility]  [address] [place] [address]    Allergy Problem List:  1. History of urticaria with significant and recent flaring  -rx: daily benadryl/allegra, montelukast 10 mg at bedtime (in reserve), Symbicort inhaler 1-2x daily  -previous tx: hydroxyzine 25 mg (stopped 9/6/19)  -future: repeat allergy testing +/- omalizumab  -atopic prick test negative 9/6/19     Encounter Date: Jul 29, 2019     CC:        Chief Complaint   Patient presents with     Allergies       Karishma is here for an allergy consult related to possible food allergies.       History of Present Illness:  Ms. Karishma Saucedo is a 44 year old female who presents in self referral/ED follow up for diffuse urticaria. The patient has a history of hypothyroidism, seasonal allergies, and urticaria. She has a longstanding history of urticaria which was very prominent when she was a child. She has since developed several food allergies including shellfish and several other food allergens which was verified at one point with intradermal prick testing (per patient). She had been controlling this with Allegra for nearly two decades. Since last visit she completed 5 day course of 40 mg prednisone on Saturday, her hives recurred diffusely on Sunday. While taking prednisone, one or two hives would pop up but much more mild than it was previously. Has been trying to compensate by taking increased amounts of allergy medication, has been taking Allegra 180 mg three times per day and hydroxyzine 25 mg three times per day, with benefit to hives. Continuing to take Symbicort BID and montelukast  "every day. Notes dry cough is resolved since last visit. She denies new soaps, detergents, pet or animal exposures, recent travel. She's not had any changes in medicine nor changes in formulation of current meds.    Hx since 08/06/19:  The patient's Xolair wasn't approved.   She was taking Allegra BID and hydroxyzine.  Today the patient states that she hasn't taken an allegra today as she wanted to see how she would do. She has been taking Allegra once daily with no hives. She has stopped the hydroxyzine.    With regards to the hydroxyzine, about a week after she saw unit(s) 8/6/19, she reports that her symptoms subsided. She began taking the hydroxyzine every other day and eventually completely went off of it.   She began taking the montelukast intermittently but hasn't completely come off of it yet. Her last dose was a couple of nights ago.    She shares that if she doesn't take her Symbicort, which she was to take BID, she begins to have an \"underlying, uncomfortable itch.\"    Today the patient shares that she would like an allergy test. She last had an allergy test in college.        Past Medical History:       Patient Active Problem List   Diagnosis     Seasonal allergies     Hypothyroidism, Dx 2004     Hair loss     Contraception     Thyrotoxicosis, exogenous iatrogenic     Endometriosis     Effusion of lower leg joint      Past Medical History        Past Medical History:   Diagnosis Date     Autoimmune hypothyroidism 2005     Dysmenorrhea           Past Surgical History         Past Surgical History:   Procedure Laterality Date     NO HISTORY OF SURGERY                Social History:  Patient reports that she has never smoked. She has never used smokeless tobacco. She reports that she drinks about 3.0 oz of alcohol per week. She reports that she does not use drugs.     Family History:  Family History         Family History   Problem Relation Age of Onset     Thyroid Disease Maternal Uncle       Diabetes " Mother       Hypertension Maternal Uncle       Diabetes Father       Thyroid Disease Father       Other Cancer Father           glioblastoma, had surgery, 77 yo     Parkinsonism Father       Thyroid Disease Other       Hyperlipidemia Other       Cancer No family hx of              Medications:  Current Outpatient Prescriptions          Current Outpatient Medications   Medication Sig Dispense Refill     budesonide-formoterol (SYMBICORT) 160-4.5 MCG/ACT Inhaler Inhale 2 puffs into the lungs 2 times daily 10.2 g 3     montelukast (SINGULAIR) 10 MG tablet Take 1 tablet (10 mg) by mouth At Bedtime 60 tablet 3     Cholecalciferol (VITAMIN D) 2000 UNITS CAPS Take 2 tablets by mouth daily         Fexofenadine HCl (ALLEGRA PO) Take 30 mg by mouth daily         letrozole (FEMARA) 2.5 MG tablet Take 1 tablet (2.5 mg) by mouth daily 90 tablet 3     norelgestromin-ethinyl estradiol (ORTHO EVRA) 150-35 MCG/24HR patch Continuous use, no weeks off 12 patch 3     predniSONE (DELTASONE) 20 MG tablet Take two tablets (= 40mg) each day for 5 (five) days 10 tablet 0     ranitidine (ZANTAC) 150 MG tablet Take 1 tablet (150 mg) by mouth 2 times daily for 10 days 20 tablet 0     SYNTHROID 75 MCG tablet MON to SAT 1 tablet/day; SUN 0.5 tablet 90 tablet 3     vitamin B complex with vitamin C (VITAMIN  B COMPLEX) TABS tablet Take 1 tablet by mouth daily                                Allergies   Allergen Reactions     Seasonal Allergies Itching     Augmentin Nausea and Vomiting     Shellfish Allergy Hives          Review of Systems:  -Constitutional: Fatigue  -Skin: As above in HPI. No additional skin concerns.     Physical exam:  Vitals: There were no vitals taken for this visit.  GEN: This is a well developed, well-nourished female in no acute distress, in a pleasant mood.  HEENT: non-injected sclera, no injection of oropharynx or mucosal edema.  PULM: No wheezing, clear to auscultation  SKIN: no current evidence of wheals/hives     Atopy  Screen (Placed 09/06/19 )    No Substance Readings (15 min) Evaluation   POS Histamine 1mg/ml ++    NEG NaCl 0.9% -+ dermographism     No Substance Readings (15 min) Evaluation   1 Alternaria alternata (tenuis)  -    2 Cladosporium herbarum -    3 Aspergillus fumigatus -    4 Penicillium notatum -    5 Dermatophagoides pteronyssinus -    6 Dermatophagoides farinae -    7 Dog epithelium (canis spp) -    8 Cat hair (sawyer catus) -    9 Cockroach   (Blatella americana & germanica) -    10 Grass mix midwest   (Lisbeth, Orchard, Redtop, Blaise) -    11 Roldan grass (sorghum halepense) -    12 Weed mix   (common Cocklebur, Lamb s quarters, rough redroot Pigweed, Dock/Sorrel) -    13 Mug wort (artemisia vulgare) -    14 Ragweed giant/short (ambrosia spp) -    15 English Plantain (plantago lanceolata) -    16 Tree mix 1 (Pecan, Maple BHR, Oak RVW, american [place], black [place]) -    17 Red cedar (juniperus virginia) -    18 Tree mix 2   (white Jani, river/red Birch, black [place], common [place], american Elm) -    19 Box elder/Maple mix (acer spp) -    20 [place] shagbark (carya ovata) -       -      Conclusion:  All prick tests negatives and no signs for atopic predisposition.    Impression/Plan:  1) chronic Urticaria with possible underlying autoimmune component or inflammatory process    Clear improvement of the Urticaria in the moment with no symptoms on Allegra    in 07/29/2019 Leucocytosis and Neutrophilia, that has now resolved 09/06/2019 (with reduction of Urticaria symptoms)    Thyroid function testing from [facility] on 8/1/19 and 4/22/19 show TSH of 11.7 and 11.1 respectively and TSH from 7/29/19 completed here had undetectable TSH. Urticaria could be linked to an autoimmune thyroid problem, however still need a clearer picture of her thyroid disease after contradictory lab values. Autoimmune thyroid disease would also not explain her leukocytosis and neutrophilia.    Labs on 7/29/19      CBC w/ diff on  7/29/19 showing leukocytosis of 19.5 and neutrophilia --> as expected with reduction of Urticaria as well no Leucocytosis on 09/06/2019!    CMP, Total IgE, Anti IgE, MEHDI, CRP on 7/29/19 all normal    Anti IgE antibodies elevated!     Thyroid function testing (4/22/19 and 5/2/19): TSH not measurable,TPO antibodies are elevated, T3 is normal    2) recurrent rhinitis and coughing (allergic? or irritant?)    Due to associated dry cough suspected it could have a  possible reactive airway component, however dry cough is  now resolved and PFTs on 7/29/19 were normal. Etiology  could be related to a number of allergens without clear cause    Atopy screen prick test negative!!    No signs for clear atopic predisposition    Spirometry normal on 7/30/19    3) food allergies?:      Procedure/Treatment    Allegra 180mg twice daily; the patient is trying to wean herself off of this     STOP Hydroxyzine 25mg at bedtime    Continue Symbicort every day. Refills provided today.    Continue Montelukast 10mg at bedtime in reserve (try to stop the Montelukast)    Completed prior authorization for Xolair, but the patient wasn't approved as of 9/6/19 (however, now Urticaria much less active)    Pt has not had any allergy tests since college and would like to have some done. 9/6/19 performed an atopic prick test    We will do the food panel in 2 months for egg, wheat, and seafood. She feels that she has a sensitivity to codfish.    The patient should be off of all antihistamines for 3 days prior the food panel.    Follow up in 2 months       Staff Involved:    Scribe Disclosure  I, Zunilda Sherman, am serving as a scribe to document services personally performed by Dr. Claude Lee, based on data collection and the provider's statements to me.     I spent a total of 25 min face to face with Lewis Schwarz during today's office visit. About 50% of the time was spent counseling the patient and/or coordinating care regarding their  allergy.        Again, thank you for allowing me to participate in the care of your patient.        Sincerely,        Claude Lee MD

## 2019-09-10 NOTE — RESULT ENCOUNTER NOTE
Left a voicemail on 09/10/2019 phone to call back to discuss  results. Clinic number provided to call back.

## 2019-12-16 ENCOUNTER — OFFICE VISIT (OUTPATIENT)
Dept: ALLERGY | Facility: CLINIC | Age: 44
End: 2019-12-16
Payer: COMMERCIAL

## 2019-12-16 DIAGNOSIS — L50.8 CHRONIC URTICARIA: Primary | ICD-10-CM

## 2019-12-16 DIAGNOSIS — J45.909 REACTIVE AIRWAY DISEASE WITHOUT COMPLICATION, UNSPECIFIED ASTHMA SEVERITY, UNSPECIFIED WHETHER PERSISTENT: ICD-10-CM

## 2019-12-16 NOTE — PROGRESS NOTES
[facility] Allergy Note     Allergy Clinic  [facility]  [address] [place] [address]    Allergy Problem List:  1. History of urticaria with significant and recent flaring  -rx: daily benadryl/allegra, montelukast 10 mg at bedtime (in reserve), Symbicort inhaler 1-2x daily  -previous tx: hydroxyzine 25 mg (stopped 9/6/19)  -future: repeat allergy testing +/- omalizumab  -atopic prick test negative 9/6/19     Encounter Date: Jul 29, 2019     CC:        Chief Complaint   Patient presents with     Allergies       Karishma is here for an allergy consult related to possible food allergies.       History of Present Illness:  Ms. Karishma Saucedo is a 44 year old female who presents in self referral/ED follow up for diffuse urticaria. The patient has a history of hypothyroidism, seasonal allergies, and urticaria. She has a longstanding history of urticaria which was very prominent when she was a child. She has since developed several food allergies including shellfish and several other food allergens which was verified at one point with intradermal prick testing (per patient). She had been controlling this with Allegra for nearly two decades. Since last visit she completed 5 day course of 40 mg prednisone on Saturday, her hives recurred diffusely on Sunday. While taking prednisone, one or two hives would pop up but much more mild than it was previously. Has been trying to compensate by taking increased amounts of allergy medication, has been taking Allegra 180 mg three times per day and hydroxyzine 25 mg three times per day, with benefit to hives. Continuing to take Symbicort BID and montelukast every day. Notes dry cough is resolved since last visit. She denies new soaps, detergents, pet or animal exposures, recent travel. She's not had any changes in medicine nor changes in formulation of current meds.    Hx since 08/06/19:  The patient's Xolair  "wasn't approved.   She was taking Allegra BID and hydroxyzine.  Today the patient states that she hasn't taken an allegra today as she wanted to see how she would do. She has been taking Allegra once daily with no hives. She has stopped the hydroxyzine.    With regards to the hydroxyzine, about a week after she saw unit(s) 8/6/19, she reports that her symptoms subsided. She began taking the hydroxyzine every other day and eventually completely went off of it.   She began taking the montelukast intermittently but hasn't completely come off of it yet. Her last dose was a couple of nights ago.    She shares that if she doesn't take her Symbicort, which she was to take BID, she begins to have an \"underlying, uncomfortable itch.\"    Today the patient shares that she would like an allergy test. She last had an allergy test in college.        Past Medical History:       Patient Active Problem List   Diagnosis     Seasonal allergies     Hypothyroidism, Dx 2004     Hair loss     Contraception     Thyrotoxicosis, exogenous iatrogenic     Endometriosis     Effusion of lower leg joint      Past Medical History        Past Medical History:   Diagnosis Date     Autoimmune hypothyroidism 2005     Dysmenorrhea           Past Surgical History         Past Surgical History:   Procedure Laterality Date     NO HISTORY OF SURGERY                Social History:  Patient reports that she has never smoked. She has never used smokeless tobacco. She reports that she drinks about 3.0 oz of alcohol per week. She reports that she does not use drugs.     Family History:  Family History         Family History   Problem Relation Age of Onset     Thyroid Disease Maternal Uncle       Diabetes Mother       Hypertension Maternal Uncle       Diabetes Father       Thyroid Disease Father       Other Cancer Father           glioblastoma, had surgery, 77 yo     Parkinsonism Father       Thyroid Disease Other       Hyperlipidemia Other       Cancer No " family hx of              Medications:  Current Outpatient Prescriptions          Current Outpatient Medications   Medication Sig Dispense Refill     budesonide-formoterol (SYMBICORT) 160-4.5 MCG/ACT Inhaler Inhale 2 puffs into the lungs 2 times daily 10.2 g 3     montelukast (SINGULAIR) 10 MG tablet Take 1 tablet (10 mg) by mouth At Bedtime 60 tablet 3     Cholecalciferol (VITAMIN D) 2000 UNITS CAPS Take 2 tablets by mouth daily         Fexofenadine HCl (ALLEGRA PO) Take 30 mg by mouth daily         letrozole (FEMARA) 2.5 MG tablet Take 1 tablet (2.5 mg) by mouth daily 90 tablet 3     norelgestromin-ethinyl estradiol (ORTHO EVRA) 150-35 MCG/24HR patch Continuous use, no weeks off 12 patch 3     predniSONE (DELTASONE) 20 MG tablet Take two tablets (= 40mg) each day for 5 (five) days 10 tablet 0     ranitidine (ZANTAC) 150 MG tablet Take 1 tablet (150 mg) by mouth 2 times daily for 10 days 20 tablet 0     SYNTHROID 75 MCG tablet MON to SAT 1 tablet/day; SUN 0.5 tablet 90 tablet 3     vitamin B complex with vitamin C (VITAMIN  B COMPLEX) TABS tablet Take 1 tablet by mouth daily                                Allergies   Allergen Reactions     Seasonal Allergies Itching     Augmentin Nausea and Vomiting     Shellfish Allergy Hives          Review of Systems:  As per HPI.  -Constitutional: Fatigue  -Skin: As above in HPI. No additional skin concerns.     Physical exam:  Vitals: There were no vitals taken for this visit.  GEN: This is a well developed, well-nourished female in no acute distress, in a pleasant mood.  HEENT: non-injected sclera, no injection of oropharynx or mucosal edema.  PULM: No wheezing, clear to auscultation  SKIN: no current evidence of wheals/hives     Atopy Screen (Placed 09/06/19 )    No Substance Readings (15 min) Evaluation   POS Histamine 1mg/ml ++    NEG NaCl 0.9% -+ dermographism     No Substance Readings (15 min) Evaluation   1 Alternaria alternata (tenuis)  -    2 Cladosporium herbarum -     3 Aspergillus fumigatus -    4 Penicillium notatum -    5 Dermatophagoides pteronyssinus -    6 Dermatophagoides farinae -    7 Dog epithelium (canis spp) -    8 Cat hair (sawyer catus) -    9 Cockroach   (Blatella americana & germanica) -    10 Grass mix midwest   (Lisbeth, Orchard, Redtop, Blaise) -    11 Roldan grass (sorghum halepense) -    12 Weed mix   (common Cocklebur, Lamb s quarters, rough redroot Pigweed, Dock/Sorrel) -    13 Mug wort (artemisia vulgare) -    14 Ragweed giant/short (ambrosia spp) -    15 English Plantain (plantago lanceolata) -    16 Tree mix 1 (Pecan, Maple BHR, Oak RVW, american [place], black [place]) -    17 Red cedar (juniperus virginia) -    18 Tree mix 2   (white Jani, river/red Birch, black [place], common [place], american Elm) -    19 Box elder/Maple mix (acer spp) -    20 [place] Flore shagbark (carya ovata) -       -      Conclusion: All prick tests negatives and no signs for atopic predisposition.      Fish and Seafood (Placed 12/16/19 )    No Substance Readings (15min) Evaluation   POS Histamine 1mg/ml ++    NEG NaCl 0.9% +/++ Dermographism!!!     No Substance Readings (15min) Evaluation   1 Codfish (gadus morhua) -    2 Flounder (platichthys spp) -    3 Tuna (thunnus albecarus) -    4 Bass (centropristis striata) -    5 Mackerel (scomberomorus cavalla) -    6 Halibut (hipoglossus hipoglossus) -    7 [place] (salmo lanette) -    8 Catfish (ictalurus spp) -    9 Perch (serranus scriba) -    10 [place], [place] (oncorhynchus mykiss) -    11 Crab (callinectes spp) -    12 Lobster (homarus americanus) -    13 Shrimp white/brown/pink (farfantepenaeus&litopenaeus) -    14 Kingcrab (paralithodes camtschatica) -    15 Scallop (placopecten magellanicus) -    16 Clam (mercenaria mercenaria) -    17 Oyster (cstrea/crassostrea) -        No Substance Readings (15min) Evaluation   1 Cows milk whole (salome rory) -    2 Casein (salome rory) -    6 Egg white (gallus gallus) -    7 Egg yolk (gallus gallus)  -    11 Barley (hordeum vulgare) -    12 Buckwheat (fagopyrum esculentum) -    13 Corn (christopher mais) -    14 Hops (humulus lupulus)  -    15 Malt (hordeum vulgare)  -    16 Oat (mac sativa) -    17 Rice (oryza sativa) -    18 Rye (secale cereale) -    19 Whole wheat (triticum aestivum) -      Conclusion: no signs for specific food allergy. However, not easy to evaluate the tests, because patient had a remarkable dermographism    Impression/Plan:    1) chronic Urticaria with possible underlying autoimmune component with TPO thyroid antibodies and anti-IgE antibodies or inflammatory process    Clear improvement of the Urticaria in the moment with no symptoms on Allegra    in 07/29/2019 Leucocytosis and Neutrophilia, that has now resolved 09/06/2019 (with reduction of Urticaria symptoms)    Thyroid function testing from [facility] on 8/1/19 and 4/22/19 show TSH of 11.7 and 11.1 respectively and TSH from 7/29/19 completed here had undetectable TSH. Urticaria could be linked to an autoimmune thyroid problem, however still need a clearer picture of her thyroid disease after contradictory lab values. Autoimmune thyroid disease would also not explain her leukocytosis and neutrophilia.  ==> I would suggest regular checking of Leucocytes and neutrophils by primary physician and if always increased I would recommend hematologic evaluation not to miss underlying hematologic problem.       Labs on 7/29/19      CBC w/ diff on 7/29/19 showing leukocytosis of 19.5 and neutrophilia --> as expected with reduction of Urticaria as well no Leucocytosis on 09/06/2019!    CMP, Total IgE, MEHDI, CRP on 7/29/19 all normal    Anti IgE antibodies elevated!     Thyroid function testing (4/22/19 and 5/2/19): TSH not measurable,TPO antibodies are elevated, T3 is normal    2) recurrent rhinitis and coughing = non-allergic    Due to associated dry cough suspected it could have a possible reactive airway component, however dry cough is now resolved  and PFTs on 7/29/19 were normal. Etiology could be related to a number of allergens without clear cause    Atopy screen prick test negative!!    No signs for clear atopic predisposition    Spirometry normal on 7/30/19    3) no signs for specific food allergies      Procedure/Treatment    Allegra 180mg 1-2 times daily (usually with 1 Allegra daily without symptoms)     STOP Hydroxyzine 25mg at bedtime (made patient very tired)    Symbicort inhalation if necessary     Stopped Montelukast 10mg at bedtime     Completed prior authorization for Xolair, but the patient wasn't approved as of 9/6/19 (however, now Urticaria much less active) --> patient herself does not want to go that way in the moment    Pt has not had any allergy tests since college and would like to have some done. 9/6/19 performed an atopic prick test    We will do the food panel in 2 months for egg, wheat, and seafood. She feels that she has a sensitivity to codfish.    The patient should be off of all antihistamines for 3 days prior the food panel.    Follow up if necessary    I spent a total of 20 minutes face to face with Karishma Saucedo during today s office visit. Over 50% of this time was spent counseling the patient and/or coordinating care.  Please see Assessment and Plan for details.  This excludes any time spent performing prick tests

## 2019-12-16 NOTE — LETTER
12/16/2019         RE: Karishma Saucedo  [address]  [facility] [address]        Dear Colleague,    Thank you for referring your patient, Karishma Saucedo, to the [facility] ALLERGY. Please see a copy of my visit note below.    [facility] Allergy Note     Allergy Clinic  [facility]  [address] [place] [address]    Allergy Problem List:  1. History of urticaria with significant and recent flaring  -rx: daily benadryl/allegra, montelukast 10 mg at bedtime (in reserve), Symbicort inhaler 1-2x daily  -previous tx: hydroxyzine 25 mg (stopped 9/6/19)  -future: repeat allergy testing +/- omalizumab  -atopic prick test negative 9/6/19     Encounter Date: Jul 29, 2019     CC:        Chief Complaint   Patient presents with     Allergies       Karishma is here for an allergy consult related to possible food allergies.       History of Present Illness:  Ms. Karishma Saucedo is a 44 year old female who presents in self referral/ED follow up for diffuse urticaria. The patient has a history of hypothyroidism, seasonal allergies, and urticaria. She has a longstanding history of urticaria which was very prominent when she was a child. She has since developed several food allergies including shellfish and several other food allergens which was verified at one point with intradermal prick testing (per patient). She had been controlling this with Allegra for nearly two decades. Since last visit she completed 5 day course of 40 mg prednisone on Saturday, her hives recurred diffusely on Sunday. While taking prednisone, one or two hives would pop up but much more mild than it was previously. Has been trying to compensate by taking increased amounts of allergy medication, has been taking Allegra 180 mg three times per day and hydroxyzine 25 mg three times per day, with benefit to hives. Continuing to take Symbicort BID and montelukast every day.  "Notes dry cough is resolved since last visit. She denies new soaps, detergents, pet or animal exposures, recent travel. She's not had any changes in medicine nor changes in formulation of current meds.    Hx since 08/06/19:  The patient's Xolair wasn't approved.   She was taking Allegra BID and hydroxyzine.  Today the patient states that she hasn't taken an allegra today as she wanted to see how she would do. She has been taking Allegra once daily with no hives. She has stopped the hydroxyzine.    With regards to the hydroxyzine, about a week after she saw unit(s) 8/6/19, she reports that her symptoms subsided. She began taking the hydroxyzine every other day and eventually completely went off of it.   She began taking the montelukast intermittently but hasn't completely come off of it yet. Her last dose was a couple of nights ago.    She shares that if she doesn't take her Symbicort, which she was to take BID, she begins to have an \"underlying, uncomfortable itch.\"    Today the patient shares that she would like an allergy test. She last had an allergy test in college.        Past Medical History:       Patient Active Problem List   Diagnosis     Seasonal allergies     Hypothyroidism, Dx 2004     Hair loss     Contraception     Thyrotoxicosis, exogenous iatrogenic     Endometriosis     Effusion of lower leg joint      Past Medical History        Past Medical History:   Diagnosis Date     Autoimmune hypothyroidism 2005     Dysmenorrhea           Past Surgical History         Past Surgical History:   Procedure Laterality Date     NO HISTORY OF SURGERY                Social History:  Patient reports that she has never smoked. She has never used smokeless tobacco. She reports that she drinks about 3.0 oz of alcohol per week. She reports that she does not use drugs.     Family History:  Family History         Family History   Problem Relation Age of Onset     Thyroid Disease Maternal Uncle       Diabetes Mother       " Hypertension Maternal Uncle       Diabetes Father       Thyroid Disease Father       Other Cancer Father           glioblastoma, had surgery, 77 yo     Parkinsonism Father       Thyroid Disease Other       Hyperlipidemia Other       Cancer No family hx of              Medications:  Current Outpatient Prescriptions          Current Outpatient Medications   Medication Sig Dispense Refill     budesonide-formoterol (SYMBICORT) 160-4.5 MCG/ACT Inhaler Inhale 2 puffs into the lungs 2 times daily 10.2 g 3     montelukast (SINGULAIR) 10 MG tablet Take 1 tablet (10 mg) by mouth At Bedtime 60 tablet 3     Cholecalciferol (VITAMIN D) 2000 UNITS CAPS Take 2 tablets by mouth daily         Fexofenadine HCl (ALLEGRA PO) Take 30 mg by mouth daily         letrozole (FEMARA) 2.5 MG tablet Take 1 tablet (2.5 mg) by mouth daily 90 tablet 3     norelgestromin-ethinyl estradiol (ORTHO EVRA) 150-35 MCG/24HR patch Continuous use, no weeks off 12 patch 3     predniSONE (DELTASONE) 20 MG tablet Take two tablets (= 40mg) each day for 5 (five) days 10 tablet 0     ranitidine (ZANTAC) 150 MG tablet Take 1 tablet (150 mg) by mouth 2 times daily for 10 days 20 tablet 0     SYNTHROID 75 MCG tablet MON to SAT 1 tablet/day; SUN 0.5 tablet 90 tablet 3     vitamin B complex with vitamin C (VITAMIN  B COMPLEX) TABS tablet Take 1 tablet by mouth daily                                Allergies   Allergen Reactions     Seasonal Allergies Itching     Augmentin Nausea and Vomiting     Shellfish Allergy Hives          Review of Systems:  As per HPI.  -Constitutional: Fatigue  -Skin: As above in HPI. No additional skin concerns.     Physical exam:  Vitals: There were no vitals taken for this visit.  GEN: This is a well developed, well-nourished female in no acute distress, in a pleasant mood.  HEENT: non-injected sclera, no injection of oropharynx or mucosal edema.  PULM: No wheezing, clear to auscultation  SKIN: no current evidence of wheals/hives     Atopy  Screen (Placed 09/06/19 )    No Substance Readings (15 min) Evaluation   POS Histamine 1mg/ml ++    NEG NaCl 0.9% -+ dermographism     No Substance Readings (15 min) Evaluation   1 Alternaria alternata (tenuis)  -    2 Cladosporium herbarum -    3 Aspergillus fumigatus -    4 Penicillium notatum -    5 Dermatophagoides pteronyssinus -    6 Dermatophagoides farinae -    7 Dog epithelium (canis spp) -    8 Cat hair (sawyer catus) -    9 Cockroach   (Blatella americana & germanica) -    10 Grass mix midwest   (Lisbeth, Orchard, Redtop, Blaise) -    11 Roldan grass (sorghum halepense) -    12 Weed mix   (common Cocklebur, Lamb s quarters, rough redroot Pigweed, Dock/Sorrel) -    13 Mug wort (artemisia vulgare) -    14 Ragweed giant/short (ambrosia spp) -    15 English Plantain (plantago lanceolata) -    16 Tree mix 1 (Pecan, Maple BHR, Oak RVW, american [place], black [place]) -    17 Red cedar (juniperus virginia) -    18 Tree mix 2   (white Jani, river/red Birch, black Sinclair, common Pinal, american Elm) -    19 Box elder/Maple mix (acer spp) -    20 Butte shagbark (carya ovata) -       -      Conclusion: All prick tests negatives and no signs for atopic predisposition.      Fish and Seafood (Placed 12/16/19 )    No Substance Readings (15min) Evaluation   POS Histamine 1mg/ml ++    NEG NaCl 0.9% +/++ Dermographism!!!     No Substance Readings (15min) Evaluation   1 Codfish (gadus morhua) -    2 Flounder (platichthys spp) -    3 Tuna (thunnus albecarus) -    4 Bass (centropristis striata) -    5 Mackerel (scomberomorus cavalla) -    6 Halibut (hipoglossus hipoglossus) -    7 [place] (salmo lanette) -    8 Catfish (ictalurus spp) -    9 Perch (serranus scriba) -    10 [place], [place] (oncorhynchus mykiss) -    11 Crab (callinectes spp) -    12 Lobster (homarus americanus) -    13 Shrimp white/brown/pink (farfantepenaeus&litopenaeus) -    14 Kingcrab (paralithodes camtschatica) -    15 Scallop (placopecten magellanicus) -     16 Clam (mercenaria mercenaria) -    17 Oyster (cstrea/crassostrea) -        No Substance Readings (15min) Evaluation   1 Cows milk whole (salome rory) -    2 Casein (salome rory) -    6 Egg white (gallus gallus) -    7 Egg yolk (gallus gallus) -    11 Barley (hordeum vulgare) -    12 Buckwheat (fagopyrum esculentum) -    13 Corn (christopher mais) -    14 Hops (humulus lupulus)  -    15 Malt (hordeum vulgare)  -    16 Oat (mac sativa) -    17 Rice (oryza sativa) -    18 Rye (secale cereale) -    19 Whole wheat (triticum aestivum) -      Conclusion: no signs for specific food allergy. However, not easy to evaluate the tests, because patient had a remarkable dermographism    Impression/Plan:    1) chronic Urticaria with possible underlying autoimmune component with TPO thyroid antibodies and anti-IgE antibodies or inflammatory process    Clear improvement of the Urticaria in the moment with no symptoms on Allegra    in 07/29/2019 Leucocytosis and Neutrophilia, that has now resolved 09/06/2019 (with reduction of Urticaria symptoms)    Thyroid function testing from [facility] on 8/1/19 and 4/22/19 show TSH of 11.7 and 11.1 respectively and TSH from 7/29/19 completed here had undetectable TSH. Urticaria could be linked to an autoimmune thyroid problem, however still need a clearer picture of her thyroid disease after contradictory lab values. Autoimmune thyroid disease would also not explain her leukocytosis and neutrophilia.  ==> I would suggest regular checking of Leucocytes and neutrophils by primary physician and if always increased I would recommend hematologic evaluation not to miss underlying hematologic problem.       Labs on 7/29/19      CBC w/ diff on 7/29/19 showing leukocytosis of 19.5 and neutrophilia --> as expected with reduction of Urticaria as well no Leucocytosis on 09/06/2019!    CMP, Total IgE, MEHDI, CRP on 7/29/19 all normal    Anti IgE antibodies elevated!     Thyroid function testing (4/22/19 and  5/2/19): TSH not measurable,TPO antibodies are elevated, T3 is normal    2) recurrent rhinitis and coughing = non-allergic    Due to associated dry cough suspected it could have a possible reactive airway component, however dry cough is now resolved and PFTs on 7/29/19 were normal. Etiology could be related to a number of allergens without clear cause    Atopy screen prick test negative!!    No signs for clear atopic predisposition    Spirometry normal on 7/30/19    3) no signs for specific food allergies      Procedure/Treatment    Allegra 180mg 1-2 times daily (usually with 1 Allegra daily without symptoms)     STOP Hydroxyzine 25mg at bedtime (made patient very tired)    Symbicort inhalation if necessary     Stopped Montelukast 10mg at bedtime     Completed prior authorization for Xolair, but the patient wasn't approved as of 9/6/19 (however, now Urticaria much less active) --> patient herself does not want to go that way in the moment    Pt has not had any allergy tests since college and would like to have some done. 9/6/19 performed an atopic prick test    We will do the food panel in 2 months for egg, wheat, and seafood. She feels that she has a sensitivity to codfish.    The patient should be off of all antihistamines for 3 days prior the food panel.    Follow up if necessary    I spent a total of 20 minutes face to face with Karishma Saucedo during today s office visit. Over 50% of this time was spent counseling the patient and/or coordinating care.  Please see Assessment and Plan for details.  This excludes any time spent performing prick tests            Patient had *** prick tests placed this visit.    Control Tests (2 tests)    Standard Atopic Panel (20 tests)    Standard Adult Panel (43 tests)    Standard Food Panel (66 tests)    Dust, Epithelia, and Feathers Panel (10 tests)    Fish, Shellfish, and Seafood Panel (17 tests)    Milk, Eggs, and Grains Panel (19 tests)    Nuts and Beans Panel (14  tests)    Spice, Vegetables, and Fruit Panel (17 tests)    Pediatric Panel (12 tests)     Medications ( *** tests)    Personal Products ( *** tests)  Fish and Seafood (Placed 12/16/19 )    No Substance Readings (15min) Evaluation   POS Histamine 1mg/ml -    NEG NaCl 0.9% -      No Substance Readings (15min) Evaluation   1 Codfish (gadus morhua) -    2 Flounder (platichthys spp) -    3 Tuna (thunnus albecarus) -    4 Bass (centropristis striata) -    5 Mackerel (scomberomorus cavalla) -    6 Halibut (hipoglossus hipoglossus) -    7 [place] (salmo lanette) -    8 Catfish (ictalurus spp) -    9 Perch (serranus scriba) -    10 [place], Bruner (oncorhynchus mykiss) -    11 Crab (callinectes spp) -    12 Lobster (homarus americanus) -    13 Shrimp white/brown/pink (farfantepenaeus&litopenaeus) -    14 Kingcrab (paralithodes camtschatica) -    15 Scallop (placopecten magellanicus) -    16 Clam (mercenaria mercenaria) -    17 Oyster (cstrea/crassostrea) -      Milk, Meat, Egg, and Grains (12/16/19 )    No Substance Readings (15min) Evaluation   POS Histamine 1mg/ml -    NEG NaCl 0.9% -      No Substance Readings (15min) Evaluation   1 Cows milk whole (salome rory) -    2 Casein (salome rory) -    6 Egg white (gallus gallus) -    7 Egg yolk (gallus gallus) -    11 Barley (hordeum vulgare) -    12 Buckwheat (fagopyrum esculentum) -    13 Corn (christopher mais) -    14 Hops (humulus lupulus)  -    15 Malt (hordeum vulgare)  -    16 Oat (mac sativa) -    17 Rice (oryza sativa) -    18 Rye (secale cereale) -    19 Whole wheat (triticum aestivum) -      Conclusion:     Again, thank you for allowing me to participate in the care of your patient.        Sincerely,        Claude Lee MD

## 2019-12-16 NOTE — PROGRESS NOTES
Fish and Seafood (Placed 12/16/19 )    No Substance Readings (15min) Evaluation   POS Histamine 1mg/ml -    NEG NaCl 0.9% -      No Substance Readings (15min) Evaluation   1 Codfish (gadus morhua) -    2 Flounder (platichthys spp) -    3 Tuna (thunnus albecarus) -    4 Bass (centropristis striata) -    5 Mackerel (scomberomorus cavalla) -    6 Halibut (hipoglossus hipoglossus) -    7 [place] (salmo lanette) -    8 Catfish (ictalurus spp) -    9 Perch (serranus scriba) -    10 [place], [place] (oncorhynchus mykiss) -    11 Crab (callinectes spp) -    12 Lobster (homarus americanus) -    13 Shrimp white/brown/pink (farfantepenaeus&litopenaeus) -    14 Kingcrab (paralithodes camtschatica) -    15 Scallop (placopecten magellanicus) -    16 Clam (mercenaria mercenaria) -    17 Oyster (cstrea/crassostrea) -      Milk, Meat, Egg, and Grains (12/16/19 )    No Substance Readings (15min) Evaluation   POS Histamine 1mg/ml -    NEG NaCl 0.9% -      No Substance Readings (15min) Evaluation   1 Cows milk whole (salome rory) -    2 Casein (salome rory) -    6 Egg white (gallus gallus) -    7 Egg yolk (gallus gallus) -    11 Barley (hordeum vulgare) -    12 Buckwheat (fagopyrum esculentum) -    13 Corn (christopher mais) -    14 Hops (humulus lupulus)  -    15 Malt (hordeum vulgare)  -    16 Oat (mac sativa) -    17 Rice (oryza sativa) -    18 Rye (secale cereale) -    19 Whole wheat (triticum aestivum) -      Conclusion:

## 2020-01-16 ENCOUNTER — ANCILLARY PROCEDURE (OUTPATIENT)
Dept: MAMMOGRAPHY | Facility: CLINIC | Age: 45
End: 2020-01-16
Attending: OBSTETRICS & GYNECOLOGY
Payer: COMMERCIAL

## 2020-01-16 DIAGNOSIS — Z12.31 VISIT FOR SCREENING MAMMOGRAM: ICD-10-CM

## 2020-02-29 ENCOUNTER — MYC MEDICAL ADVICE (OUTPATIENT)
Dept: OBGYN | Facility: CLINIC | Age: 45
End: 2020-02-29

## 2020-02-29 DIAGNOSIS — N80.9 ENDOMETRIOSIS: ICD-10-CM

## 2020-02-29 DIAGNOSIS — Z30.431 SURVEILLANCE OF INTRAUTERINE CONTRACEPTIVE DEVICE: ICD-10-CM

## 2020-02-29 RX ORDER — NORELGESTROMIN AND ETHINYL ESTRADIOL 35; 150 UG/MG; UG/MG
PATCH TRANSDERMAL
Qty: 16 PATCH | Refills: 0 | Status: SHIPPED | OUTPATIENT
Start: 2020-02-29 | End: 2020-06-09

## 2020-03-02 ENCOUNTER — HEALTH MAINTENANCE LETTER (OUTPATIENT)
Age: 45
End: 2020-03-02

## 2020-06-09 DIAGNOSIS — Z30.431 SURVEILLANCE OF INTRAUTERINE CONTRACEPTIVE DEVICE: ICD-10-CM

## 2020-06-09 DIAGNOSIS — N80.9 ENDOMETRIOSIS: ICD-10-CM

## 2020-06-09 RX ORDER — NORELGESTROMIN AND ETHINYL ESTRADIOL 35; 150 UG/MG; UG/MG
PATCH TRANSDERMAL
Qty: 16 PATCH | Refills: 0 | Status: SHIPPED | OUTPATIENT
Start: 2020-06-09 | End: 2020-06-24

## 2020-06-09 NOTE — TELEPHONE ENCOUNTER
Received refill request for Xulane patch.  Patient has upcoming visit in June 2020.  Refill sent.

## 2020-06-15 ENCOUNTER — TELEPHONE (OUTPATIENT)
Dept: OBGYN | Facility: CLINIC | Age: 45
End: 2020-06-15

## 2020-06-15 NOTE — TELEPHONE ENCOUNTER
Patients appointmetn was cancelled with dr joyce- she is having a urinary issues that she wants to talk to her about-  Phone visit was made.

## 2020-06-24 ENCOUNTER — OFFICE VISIT (OUTPATIENT)
Dept: OBGYN | Facility: CLINIC | Age: 45
End: 2020-06-24
Attending: OBSTETRICS & GYNECOLOGY
Payer: COMMERCIAL

## 2020-06-24 DIAGNOSIS — Z30.431 SURVEILLANCE OF INTRAUTERINE CONTRACEPTIVE DEVICE: ICD-10-CM

## 2020-06-24 DIAGNOSIS — B37.31 YEAST INFECTION OF THE VAGINA: Primary | ICD-10-CM

## 2020-06-24 DIAGNOSIS — N80.9 ENDOMETRIOSIS: ICD-10-CM

## 2020-06-24 RX ORDER — LETROZOLE 2.5 MG/1
2.5 TABLET, FILM COATED ORAL DAILY
Qty: 90 TABLET | Refills: 3 | Status: SHIPPED | OUTPATIENT
Start: 2020-06-24 | End: 2021-06-29

## 2020-06-24 RX ORDER — NORELGESTROMIN AND ETHINYL ESTRADIOL 35; 150 UG/MG; UG/MG
PATCH TRANSDERMAL
Qty: 16 PATCH | Refills: 3 | Status: SHIPPED | OUTPATIENT
Start: 2020-06-24 | End: 2021-08-02

## 2020-06-24 RX ORDER — FLUCONAZOLE 150 MG/1
150 TABLET ORAL DAILY
Qty: 1 TABLET | Refills: 0 | Status: SHIPPED | OUTPATIENT
Start: 2020-06-24 | End: 2020-06-25

## 2020-06-24 NOTE — LETTER
2020       RE: Karishma Saucedo  [address]  [facility] [address]     Dear Colleague,    Thank you for referring your patient, Karishma Saucedo, to the [facility]. Please see a copy of my visit note below.    44 yo  amenorrheic on OCPs.     Presents for concern for ongoing intermittent white discharge with occasional  itching.   Started probiotics which may be helping ever so slightly.  Admitted several times with severe anaphylaxis reaction to unknown trigger.   Low WBC was identified. (19 was WBC 9.3)    Denies yellow, painful, or foul odor.     Patient Active Problem List   Diagnosis     Seasonal allergies     Hypothyroidism, Dx      Hair loss     Contraception     Thyrotoxicosis, exogenous iatrogenic     Endometriosis     Effusion of lower leg joint     Chronic urticaria     Past Medical History:   Diagnosis Date     Autoimmune hypothyroidism 2005     Dysmenorrhea      OB History    Para Term  AB Living   1 0 0 0 1 0   SAB TAB Ectopic Multiple Live Births   1 0 0 0 0      # Outcome Date GA Lbr Evan/2nd Weight Sex Delivery Anes PTL Lv   1 SAB               Obstetric Comments   LMP 2014     Review Of Systems  Skin: negative  Eyes: negative  Ears/Nose/Throat: negative  Respiratory: No shortness of breath, dyspnea on exertion, cough, or hemoptysis  Cardiovascular: negative  Gastrointestinal: negative  Genitourinary: as above  Musculoskeletal: negative  Neurologic: negative  Psychiatric: negative  Hematologic/Lymphatic/Immunologic: negative  Endocrine: negative    O: sounds well over phone    A/P: likely candida vaginitis- recommend trial diflucan  In person visit if not resolved.    Total visit time was 20 minutes with 20 minutes spent in counseling and coordination of care for vaginitis.    Telemedicine Visit: The patient's condition can be safely assessed and treated in telemedicine encounter.       Reason for Telemedicine Visit: Patient unable to travel COVID 19    Originating Site (Patient Location): Patient's home    Distant Site (Provider Location): [facility]: [facility]    Consent:  The patient/guardian has verbally consented to: the potential risks and benefits of telemedicine versus in person care; bill my insurance or make self-payment for services provided; and responsibility for payment of non-covered services.     Mode of Communication:  Doximity    As the provider I attest to compliance with applicable laws and regulations related to telemedicine.  Hoda Pearson MD

## 2020-06-24 NOTE — PROGRESS NOTES
44 yo  amenorrheic on OCPs.     Presents for concern for ongoing intermittent white discharge with occasional  itching.   Started probiotics which may be helping ever so slightly.  Admitted several times with severe anaphylaxis reaction to unknown trigger.   Low WBC was identified. (19 was WBC 9.3)    Denies yellow, painful, or foul odor.     Patient Active Problem List   Diagnosis     Seasonal allergies     Hypothyroidism, Dx      Hair loss     Contraception     Thyrotoxicosis, exogenous iatrogenic     Endometriosis     Effusion of lower leg joint     Chronic urticaria     Past Medical History:   Diagnosis Date     Autoimmune hypothyroidism 2005     Dysmenorrhea      OB History    Para Term  AB Living   1 0 0 0 1 0   SAB TAB Ectopic Multiple Live Births   1 0 0 0 0      # Outcome Date GA Lbr Evan/2nd Weight Sex Delivery Anes PTL Lv   1 SAB               Obstetric Comments   LMP 2014     Review Of Systems  Skin: negative  Eyes: negative  Ears/Nose/Throat: negative  Respiratory: No shortness of breath, dyspnea on exertion, cough, or hemoptysis  Cardiovascular: negative  Gastrointestinal: negative  Genitourinary: as above  Musculoskeletal: negative  Neurologic: negative  Psychiatric: negative  Hematologic/Lymphatic/Immunologic: negative  Endocrine: negative    O: sounds well over phone    A/P: likely candida vaginitis- recommend trial diflucan  In person visit if not resolved.    Total visit time was 20 minutes with 20 minutes spent in counseling and coordination of care for vaginitis.    Telemedicine Visit: The patient's condition can be safely assessed and treated in telemedicine encounter.      Reason for Telemedicine Visit: Patient unable to travel COVID 19    Originating Site (Patient Location): Patient's home    Distant Site (Provider Location): [facility]: [facility]    Consent:  The patient/guardian has verbally consented to: the potential risks and benefits of telemedicine  versus in person care; bill my insurance or make self-payment for services provided; and responsibility for payment of non-covered services.     Mode of Communication:  Doximity    As the provider I attest to compliance with applicable laws and regulations related to telemedicine.  Hoda Pearson MD

## 2020-07-01 ENCOUNTER — APPOINTMENT (OUTPATIENT)
Dept: LAB | Facility: CLINIC | Age: 45
End: 2020-07-01
Attending: INTERNAL MEDICINE
Payer: COMMERCIAL

## 2020-08-19 ENCOUNTER — TELEPHONE (OUTPATIENT)
Dept: ALLERGY | Facility: CLINIC | Age: 45
End: 2020-08-19

## 2020-12-20 ENCOUNTER — HEALTH MAINTENANCE LETTER (OUTPATIENT)
Age: 45
End: 2020-12-20

## 2021-01-28 ENCOUNTER — APPOINTMENT (OUTPATIENT)
Dept: LAB | Facility: CLINIC | Age: 46
End: 2021-01-28
Payer: COMMERCIAL

## 2021-02-17 DIAGNOSIS — E03.9 HYPOTHYROIDISM: ICD-10-CM

## 2021-02-17 DIAGNOSIS — J30.89 NON-SEASONAL ALLERGIC RHINITIS DUE TO OTHER ALLERGIC TRIGGER: Primary | ICD-10-CM

## 2021-02-17 DIAGNOSIS — J45.909 REACTIVE AIRWAY DISEASE WITHOUT COMPLICATION, UNSPECIFIED ASTHMA SEVERITY, UNSPECIFIED WHETHER PERSISTENT: ICD-10-CM

## 2021-02-17 DIAGNOSIS — J45.40 MODERATE PERSISTENT EXTRINSIC ASTHMA WITHOUT COMPLICATION: ICD-10-CM

## 2021-02-17 DIAGNOSIS — E05.80 THYROTOXICOSIS, EXOGENOUS IATROGENIC: ICD-10-CM

## 2021-02-17 RX ORDER — FEXOFENADINE HCL 180 MG/1
180 TABLET ORAL DAILY
Qty: 30 TABLET | Refills: 3 | Status: SHIPPED | OUTPATIENT
Start: 2021-02-17 | End: 2022-05-05

## 2021-02-17 RX ORDER — BUDESONIDE AND FORMOTEROL FUMARATE DIHYDRATE 160; 4.5 UG/1; UG/1
2 AEROSOL RESPIRATORY (INHALATION) 2 TIMES DAILY
Qty: 10.2 G | Refills: 3 | Status: SHIPPED | OUTPATIENT
Start: 2021-02-17

## 2021-04-19 ENCOUNTER — IMMUNIZATION (OUTPATIENT)
Dept: NURSING | Facility: CLINIC | Age: 46
End: 2021-04-19
Payer: COMMERCIAL

## 2021-04-19 PROCEDURE — 91300 PR COVID VAC PFIZER DIL RECON 30 MCG/0.3 ML IM: CPT

## 2021-04-19 PROCEDURE — 0001A PR COVID VAC PFIZER DIL RECON 30 MCG/0.3 ML IM: CPT

## 2021-04-24 ENCOUNTER — HEALTH MAINTENANCE LETTER (OUTPATIENT)
Age: 46
End: 2021-04-24

## 2021-05-03 ENCOUNTER — APPOINTMENT (OUTPATIENT)
Dept: LAB | Facility: CLINIC | Age: 46
End: 2021-05-03
Attending: INTERNAL MEDICINE
Payer: COMMERCIAL

## 2021-05-10 ENCOUNTER — IMMUNIZATION (OUTPATIENT)
Dept: NURSING | Facility: CLINIC | Age: 46
End: 2021-05-10
Attending: INTERNAL MEDICINE
Payer: COMMERCIAL

## 2021-05-10 PROCEDURE — 91300 PR COVID VAC PFIZER DIL RECON 30 MCG/0.3 ML IM: CPT

## 2021-05-10 PROCEDURE — 0002A PR COVID VAC PFIZER DIL RECON 30 MCG/0.3 ML IM: CPT

## 2021-06-10 ENCOUNTER — ANCILLARY PROCEDURE (OUTPATIENT)
Dept: MAMMOGRAPHY | Facility: CLINIC | Age: 46
End: 2021-06-10
Attending: OBSTETRICS & GYNECOLOGY
Payer: COMMERCIAL

## 2021-06-10 DIAGNOSIS — Z12.31 VISIT FOR SCREENING MAMMOGRAM: ICD-10-CM

## 2021-06-10 PROCEDURE — 77067 SCR MAMMO BI INCL CAD: CPT | Performed by: RADIOLOGY

## 2021-06-10 PROCEDURE — 77063 BREAST TOMOSYNTHESIS BI: CPT | Performed by: RADIOLOGY

## 2021-06-29 DIAGNOSIS — N80.9 ENDOMETRIOSIS: ICD-10-CM

## 2021-06-29 RX ORDER — LETROZOLE 2.5 MG/1
2.5 TABLET, FILM COATED ORAL DAILY
Qty: 90 TABLET | Refills: 1 | Status: SHIPPED | OUTPATIENT
Start: 2021-06-29 | End: 2021-12-22

## 2021-06-29 NOTE — TELEPHONE ENCOUNTER
Received refill request for letrozole. Has upcoming annual exam in October with Dr Pearson    Refilled to get through to annual

## 2021-07-12 ENCOUNTER — MYC MEDICAL ADVICE (OUTPATIENT)
Dept: INTERNAL MEDICINE | Facility: CLINIC | Age: 46
End: 2021-07-12

## 2021-07-18 ASSESSMENT — ENCOUNTER SYMPTOMS
ARTHRALGIAS: 0
MUSCLE CRAMPS: 0
NECK PAIN: 0
JOINT SWELLING: 1
BACK PAIN: 1
MUSCLE WEAKNESS: 0
STIFFNESS: 1

## 2021-07-20 ENCOUNTER — TELEPHONE (OUTPATIENT)
Dept: INTERNAL MEDICINE | Facility: CLINIC | Age: 46
End: 2021-07-20

## 2021-07-20 ENCOUNTER — OFFICE VISIT (OUTPATIENT)
Dept: INTERNAL MEDICINE | Facility: CLINIC | Age: 46
End: 2021-07-20
Payer: COMMERCIAL

## 2021-07-20 VITALS
OXYGEN SATURATION: 99 % | BODY MASS INDEX: 24.11 KG/M2 | DIASTOLIC BLOOD PRESSURE: 77 MMHG | WEIGHT: 131.2 LBS | HEART RATE: 87 BPM | SYSTOLIC BLOOD PRESSURE: 116 MMHG

## 2021-07-20 DIAGNOSIS — M54.50 LOW BACK PAIN, UNSPECIFIED BACK PAIN LATERALITY, UNSPECIFIED CHRONICITY, UNSPECIFIED WHETHER SCIATICA PRESENT: ICD-10-CM

## 2021-07-20 DIAGNOSIS — M54.50 LOW BACK PAIN, UNSPECIFIED BACK PAIN LATERALITY, UNSPECIFIED CHRONICITY, UNSPECIFIED WHETHER SCIATICA PRESENT: Primary | ICD-10-CM

## 2021-07-20 PROCEDURE — 99203 OFFICE O/P NEW LOW 30 MIN: CPT | Mod: GC

## 2021-07-20 RX ORDER — CYCLOBENZAPRINE HCL 5 MG
5 TABLET ORAL 2 TIMES DAILY PRN
Qty: 28 TABLET | Refills: 0 | Status: SHIPPED | OUTPATIENT
Start: 2021-07-20 | End: 2021-07-20

## 2021-07-20 RX ORDER — CYCLOBENZAPRINE HCL 5 MG
5 TABLET ORAL 2 TIMES DAILY PRN
Qty: 28 TABLET | OUTPATIENT
Start: 2021-07-20

## 2021-07-20 RX ORDER — CYCLOBENZAPRINE HCL 5 MG
5 TABLET ORAL 2 TIMES DAILY PRN
Qty: 28 TABLET | Refills: 0 | Status: SHIPPED | OUTPATIENT
Start: 2021-07-20

## 2021-07-20 ASSESSMENT — PAIN SCALES - GENERAL: PAINLEVEL: MODERATE PAIN (4)

## 2021-07-20 NOTE — TELEPHONE ENCOUNTER
[facility] Call Center    Phone Message    May a detailed message be left on voicemail: yes     Reason for Call: Other: pharmacy calling to see what happened to the prescription that was supposed to come to the pharmacy. Please call them or send another one to the pharmacy.     Action Taken: Message routed to:  [facility]    Travel Screening: Not Applicable

## 2021-07-20 NOTE — PROGRESS NOTES
Internal Medicine Primary Care   SUBJECTIVE  CC: Back pain    HPI: Karishma Saucedo is a 46 year old female presenting for a back pain that began 2 weeks ago when she was doing bent over rows.  She reports the pain was 10 out of 10 on the day of the injury, she was unable to move.  Since then the pain has improved, today the pain is 2.5-3 out of 10.  Patient has been taking Advil and Tylenol as needed for pain reports that they have not been helping too much.  She reports that when she wakes up in the morning there is a burning pain that happens toward her tailbone and decreases in severity as she goes about her daily activities.  Denies fever neurological deficits bowel or bladder incontinence, saddle anesthesia, IV drug use, nocturnal pain.  Patient would like to avoid reinjury and is seeking physical therapy referral.    Reports that she had a similar injury in the past, 4-5 years ago that has healed well.     Review of Systems:  Constitutional: Denies fever, weight loss/gain, fatigue, chills and sweats  Vision: Denies blurry vision, visual disturbances and changes. Denies congestion  Pulmonary: Denies SOB, chest tightness, wheezing  Cardio: Denies chest pain, palpitations  : Denies difficulty urinating, dysuria, frequency and urgency  Neurologic: Denies lightheadedness/dizziness, denies LOC, focal weakness, numbness/tingling    PAST MEDICAL HISTORY  Patient Active Problem List   Diagnosis     Seasonal allergies     Hypothyroidism, Dx 2004     Hair loss     Contraception     Thyrotoxicosis, exogenous iatrogenic     Endometriosis     Effusion of lower leg joint     Chronic urticaria       MEDICATIONS  Current Outpatient Medications   Medication Sig Dispense Refill     budesonide-formoterol (SYMBICORT) 160-4.5 MCG/ACT Inhaler Inhale 2 puffs into the lungs 2 times daily 10.2 g 3     Cholecalciferol (VITAMIN D) 2000 UNITS CAPS Take 2 tablets by mouth daily       fexofenadine (ALLEGRA) 180 MG  tablet Take 1 tablet (180 mg) by mouth daily 30 tablet 3     fexofenadine (ALLEGRA) 180 MG tablet Take 1 tablet (180 mg) by mouth 2 times daily 90 tablet 3     hydrOXYzine (ATARAX) 25 MG tablet Take 1 tablet (25 mg) by mouth 3 times daily as needed for itching 30 tablet 3     letrozole (FEMARA) 2.5 MG tablet Take 1 tablet (2.5 mg) by mouth daily 90 tablet 1     montelukast (SINGULAIR) 10 MG tablet Take 1 tablet (10 mg) by mouth At Bedtime 60 tablet 3     norelgestromin-ethinyl estradiol (ORTHO EVRA) 150-35 MCG/24HR patch Continuous use, no weeks off 16 patch 3     predniSONE (DELTASONE) 20 MG tablet Take two tablets (= 40mg) each day for 5 (five) days 10 tablet 0     SYNTHROID 75 MCG tablet MON to SAT 1 tablet/day; SUN 0.5 tablet 90 tablet 3     vitamin B complex with vitamin C (VITAMIN  B COMPLEX) TABS tablet Take 1 tablet by mouth daily         PAST SURGICAL HISTORY  Past Surgical History:   Procedure Laterality Date     NO HISTORY OF SURGERY         SOCIAL HISTORY    Tobacco, status; current consumption: Never smoker    Alcohol: 1/2x a week    Drugs: Denies    Sexual history: Active, with a single male partner ()    Travel: No recent travel    FAMILY HISTORY  Family History   Problem Relation Age of Onset     Thyroid Disease Maternal Uncle      Diabetes Mother      Hypertension Maternal Uncle      Diabetes Father      Thyroid Disease Father      Other Cancer Father         glioblastoma, had surgery, 75 yo     Parkinsonism Father      Thyroid Disease Other      Hyperlipidemia Other      Cancer No family hx of          ALLERGIES     Allergies   Allergen Reactions     Seasonal Allergies Itching     Augmentin Nausea and Vomiting     Shellfish Allergy Hives       OBJECTIVE    Vitals  /77 (BP Location: Right arm, Patient Position: Sitting, Cuff Size: Adult Regular)   Pulse 87   Wt 59.5 kg (131 lb 3.2 oz)   SpO2 99%   BMI 24.11 kg/m        Last 6 BPs  BP Readings from Last 6 Encounters:   07/29/19  117/89   07/28/19 120/81   05/10/19 122/79   07/03/18 119/85   06/22/18 130/85   11/20/17 120/79       Physical Exam  Physical Exam  Constitutional:       Appearance: Normal appearance. She is normal weight.   HENT:      Head: Normocephalic and atraumatic.   Cardiovascular:      Rate and Rhythm: Normal rate and regular rhythm.      Pulses: Normal pulses.      Heart sounds: Normal heart sounds.   Pulmonary:      Effort: Pulmonary effort is normal.      Breath sounds: Normal breath sounds.   Musculoskeletal:      Comments: Restricted passive ROM on flexion. Normal ROM on extension and lateral flexion. Straight leg test negative b/l.  +3 strength on L knee extension. +5 R knee extension. Rest of the exam negative. Tenderness to palpation left PSIS and midline sacrum   Neurological:      General: No focal deficit present.      Mental Status: She is alert and oriented to person, place, and time.      Sensory: No sensory deficit.      Gait: Gait normal.      Deep Tendon Reflexes: Reflexes normal.   Psychiatric:         Mood and Affect: Mood normal.         Behavior: Behavior normal.       ASSESSMENT AND PLAN    1. Low back pain - likely muscular injury  - Patient referred to physical therapy. Should help her heal better and increase her core and low back strength to prevent re injury in the future.  - Start flexeril 5 mg BID, prescription sent  - Patient given instructions to go to the ED if any new symptoms develop    Patient understands and agrees with the above assessment and plan. Patient was staffed and seen with Dr. Elidia Gant DO  PGY 1, Internal Medicine  Pager: [phone]    Pt was seen and examined with Dr. Gr.  I agree with his documentation as noted above.    My additional comments: None    Jerome Brenner MD

## 2021-07-20 NOTE — TELEPHONE ENCOUNTER
cyclobenzaprine (FLEXERIL) 5 MG tablet  Last Written Prescription Date:  7/20/2021-8/3/2021  Last Fill Quantity:  28 # refills: 0  Last Office Visit : 7/20/2021  Future Office visit:  None    Routing refill request to provider for review/approval because:  Medication never made it to the pharmacy for Pt care  It was sent to print.   Please send new order to pharmacy for Pt care.      Thank you       Krystina Phelan RN  Central Triage Red Flags/Med Refills

## 2021-07-20 NOTE — NURSING NOTE
Chief Complaint   Patient presents with     Musculoskeletal Problem     worried about cyatica, stomach does react to too many pain meds, lower back into back of left leg, incident happened 2 weeks ago, possible referal, does not like taking pain medications       Susanne Bradford, EMT at 8:56 AM on 7/20/2021

## 2021-07-23 ENCOUNTER — OFFICE VISIT (OUTPATIENT)
Dept: ORTHOPEDICS | Facility: CLINIC | Age: 46
End: 2021-07-23
Payer: COMMERCIAL

## 2021-07-23 ENCOUNTER — PRE VISIT (OUTPATIENT)
Dept: ORTHOPEDICS | Facility: CLINIC | Age: 46
End: 2021-07-23

## 2021-07-23 ENCOUNTER — ANCILLARY PROCEDURE (OUTPATIENT)
Dept: GENERAL RADIOLOGY | Facility: CLINIC | Age: 46
End: 2021-07-23
Attending: FAMILY MEDICINE
Payer: COMMERCIAL

## 2021-07-23 VITALS — WEIGHT: 131 LBS | HEIGHT: 62 IN | BODY MASS INDEX: 24.11 KG/M2

## 2021-07-23 DIAGNOSIS — M54.50 ACUTE LOW BACK PAIN, UNSPECIFIED BACK PAIN LATERALITY, UNSPECIFIED WHETHER SCIATICA PRESENT: Primary | ICD-10-CM

## 2021-07-23 DIAGNOSIS — M54.50 ACUTE LOW BACK PAIN, UNSPECIFIED BACK PAIN LATERALITY, UNSPECIFIED WHETHER SCIATICA PRESENT: ICD-10-CM

## 2021-07-23 PROCEDURE — 99204 OFFICE O/P NEW MOD 45 MIN: CPT | Performed by: FAMILY MEDICINE

## 2021-07-23 PROCEDURE — 72100 X-RAY EXAM L-S SPINE 2/3 VWS: CPT | Performed by: RADIOLOGY

## 2021-07-23 RX ORDER — PREDNISONE 20 MG/1
40 TABLET ORAL DAILY
Qty: 10 TABLET | Refills: 0 | Status: SHIPPED | OUTPATIENT
Start: 2021-07-23 | End: 2021-07-28

## 2021-07-23 ASSESSMENT — MIFFLIN-ST. JEOR: SCORE: 1192.46

## 2021-07-23 NOTE — PROGRESS NOTES
[facility]  SPORTS & ORTHOPEDIC CLINIC VISIT     Jul 23, 2021        ASSESSMENT & PLAN    46-year-old with acute left-sided low back pain with suspected left-sided radiculopathy    Reviewed imaging and assessment with patient in detail  Plan to treat with an initial course of prednisone for 5 days.  She can continue to use muscle relaxant if she finds it to be helpful but otherwise can discontinue.  If she is experiencing improvement after the course of prednisone she will follow-up with physical therapy.  If not she will contact us and we will discuss next options which could involve advanced imaging of the lumbar spine.    Sanford Engel MD  [facility] SPORTS MEDICINE CLINIC [place]    -----  Chief Complaint   Patient presents with     Lower Back - Pain       SUBJECTIVE  Karishma Saucedo is a/an 46 year old female who is seen as a self referral for evaluation of  Left sided low back pain.     The patient is seen with their .  The patient is Right handed    she was doing a bent over row with a deadlift at home.  The pain began after the exercise.  Not acutely during exercise.  Muscle relaxers only make her sleepy, does not provide resolution.    Onset: 3 week(s) ago. Patient describes injury as at home exercises.  Seems to be getting worse.  Location of Pain: left medial and posterior thigh - does not go into knee  Worsened by: prolonged positions (sitting, standing)  Better with: supine, rest, standing  Treatments tried: rest/activity avoidance and other medications: muscle relaxer, stretches, some HEP  Associated symptoms: numbness and tingling, burning    Orthopedic/Surgical history: NO prior history of significant back injury or back pain  Social History/Occupation: not currently working      REVIEW OF SYSTEMS:    Do you have fever, chills, weight loss? No    Do you have any vision problems? No    Do you have any chest pain or edema? No    Do you have any shortness  "of breath or wheezing?  No    Do you have stomach problems? Yes, sensitive to NSAIDs    Do you have any numbness or focal weakness? No    Do you have diabetes? Yes, family history    Do you have problems with bleeding or clotting? No    Do you have an rashes or other skin lesions? No    OBJECTIVE:  Ht 1.575 m (5' 2\")   Wt 59.4 kg (131 lb)   BMI 23.96 kg/m       General: alert, pleasant, no distress. sitting comfortably in chair  Back/Spine: no tenderness to palpation of spinous processes, or paraspinous musculature of lumbar spine.  Pain with forward flexion slump test reproduces pain in the back but no radicular symptoms on the left.  Mild hamstring tightness noted.  No pain in back with MINNIE testing bilaterally  Neuro: SILT on bilateral lower extremities, can stand on toes and heel walk without difficulty, patellar and achilles reflexes 2+ and symmetric,       RADIOLOGY:    2 view xrays of lumbar spine performed and reviewed independently demonstrating No acute fracture or listhesis.  Mild disc height loss at L5-S1 otherwise no significant degenerative disease.. See EMR for formal radiology report.                  \    "

## 2021-07-23 NOTE — LETTER
7/23/2021      RE: Karishma Saucedo  [address]  [facility] [address]         [facility]  SPORTS & ORTHOPEDIC CLINIC VISIT     Jul 23, 2021        ASSESSMENT & PLAN    46-year-old with acute left-sided low back pain with suspected left-sided radiculopathy    Reviewed imaging and assessment with patient in detail  Plan to treat with an initial course of prednisone for 5 days.  She can continue to use muscle relaxant if she finds it to be helpful but otherwise can discontinue.  If she is experiencing improvement after the course of prednisone she will follow-up with physical therapy.  If not she will contact us and we will discuss next options which could involve advanced imaging of the lumbar spine.    Sanford Engel MD  [facility] SPORTS MEDICINE CLINIC [place]    -----  Chief Complaint   Patient presents with     Lower Back - Pain       SUBJECTIVE  Karishma Saucedo is a/an 46 year old female who is seen as a self referral for evaluation of  Left sided low back pain.     The patient is seen with their .  The patient is Right handed    she was doing a bent over row with a deadlift at home.  The pain began after the exercise.  Not acutely during exercise.  Muscle relaxers only make her sleepy, does not provide resolution.    Onset: 3 week(s) ago. Patient describes injury as at home exercises.  Seems to be getting worse.  Location of Pain: left medial and posterior thigh - does not go into knee  Worsened by: prolonged positions (sitting, standing)  Better with: supine, rest, standing  Treatments tried: rest/activity avoidance and other medications: muscle relaxer, stretches, some HEP  Associated symptoms: numbness and tingling, burning    Orthopedic/Surgical history: NO prior history of significant back injury or back pain  Social History/Occupation: not currently working      REVIEW OF SYSTEMS:    Do you have fever, chills, weight loss? No    Do you have  "any vision problems? No    Do you have any chest pain or edema? No    Do you have any shortness of breath or wheezing?  No    Do you have stomach problems? Yes, sensitive to NSAIDs    Do you have any numbness or focal weakness? No    Do you have diabetes? Yes, family history    Do you have problems with bleeding or clotting? No    Do you have an rashes or other skin lesions? No    OBJECTIVE:  Ht 1.575 m (5' 2\")   Wt 59.4 kg (131 lb)   BMI 23.96 kg/m       General: alert, pleasant, no distress. sitting comfortably in chair  Back/Spine: no tenderness to palpation of spinous processes, or paraspinous musculature of lumbar spine.  Pain with forward flexion slump test reproduces pain in the back but no radicular symptoms on the left.  Mild hamstring tightness noted.  No pain in back with MINNIE testing bilaterally  Neuro: SILT on bilateral lower extremities, can stand on toes and heel walk without difficulty, patellar and achilles reflexes 2+ and symmetric,       RADIOLOGY:    2 view xrays of lumbar spine performed and reviewed independently demonstrating No acute fracture or listhesis.  Mild disc height loss at L5-S1 otherwise no significant degenerative disease.. See EMR for formal radiology report.           Sanford Engel MD    "

## 2021-07-23 NOTE — TELEPHONE ENCOUNTER
DIAGNOSIS: Left LBP   APPOINTMENT DATE: 7.23.21   NOTES STATUS DETAILS   OFFICE NOTE from referring provider N/A    OFFICE NOTE from other specialist Internal 7.20.21 CAROL Gant [facility]    DISCHARGE SUMMARY from hospital N/A    DISCHARGE REPORT from the ER N/A    OPERATIVE REPORT N/A    EMG report N/A    MEDICATION LIST Internal    MRI N/A    DEXA (osteoporosis/bone health) N/A    CT SCAN N/A    XRAYS (IMAGES & REPORTS) N/A

## 2021-08-02 ENCOUNTER — THERAPY VISIT (OUTPATIENT)
Dept: PHYSICAL THERAPY | Facility: CLINIC | Age: 46
End: 2021-08-02
Payer: COMMERCIAL

## 2021-08-02 DIAGNOSIS — N80.9 ENDOMETRIOSIS: ICD-10-CM

## 2021-08-02 DIAGNOSIS — Z30.431 SURVEILLANCE OF INTRAUTERINE CONTRACEPTIVE DEVICE: ICD-10-CM

## 2021-08-02 DIAGNOSIS — M54.50 LOW BACK PAIN, UNSPECIFIED BACK PAIN LATERALITY, UNSPECIFIED CHRONICITY, UNSPECIFIED WHETHER SCIATICA PRESENT: Primary | ICD-10-CM

## 2021-08-02 PROCEDURE — 97161 PT EVAL LOW COMPLEX 20 MIN: CPT | Mod: GP | Performed by: PHYSICAL THERAPIST

## 2021-08-02 PROCEDURE — 97530 THERAPEUTIC ACTIVITIES: CPT | Mod: GP | Performed by: PHYSICAL THERAPIST

## 2021-08-02 RX ORDER — NORELGESTROMIN AND ETHINYL ESTRADIOL 35; 150 UG/MG; UG/MG
PATCH TRANSDERMAL
Qty: 16 PATCH | Refills: 3 | Status: SHIPPED | OUTPATIENT
Start: 2021-08-02 | End: 2021-12-22

## 2021-08-02 NOTE — TELEPHONE ENCOUNTER
Refill request received for Xulalne patch. Pt has upcoming annual scheduled with Dr. Pearson 10/20/21. Refill sent per protocol. Plisten message sent to inform pt this was done.

## 2021-08-02 NOTE — PROGRESS NOTES
Lumbar Spine Evaluation      Physical Therapy Initial Examination/Evaluation  August 2, 2021    Karishma Saucedo is a 46 year old female referred to physical therapy by Beatriz Gant MD for treatment of low back pain with Precautions/Restrictions/MD instructions see physical therapy order.     Therapist Impression:   Karishma is seen in therapy for an acute exacerbation of low back pain brought on during an at home workout. She demonstrates a preference to extension exercises which were implemented in her home exercise program. She will benefit from ongoing physical therapy to progress exercises accordingly in order to return to previous level of function and reach set goals.     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 - nothing like this Previous Treatment: Pain medications, Walking and stretches provided by PCP  Effect of prior treatment: worse with stretches, improved with lidoaine  Imaging: x-ray  Chief Complaint/Functional Limitations:  Difficulty sitting for prolonged periods,   and see below in therapy evaluation codes   Pain: rest 3-4 /10, activity 9/10 Location: left low back, glutes Frequency: Constant Described as: aching and sharp Alleviated by: lidocaine patches, prednisone, walking Progression of Symptoms: Unchanged Time of day when pain is worse: Position related  Sleeping: Interrupted due to current issue - improved with lidocaine [place]   Occupation: student  Job duties: prolonged sitting, keyboarding/computer use, prolonged standing  Current HEP/exercise regimen: light stretching, walking   Patient's goals are be able to workout, sit without pain, get back to usual activities     Other pertinent PMH/Red Flags: None   Barriers at home/work: None as reported by patient  Pertinent Surgical History: none    Medications: None as reported by patient  General health as reported by patient: excellent        Answers for HPI/ROS submitted by the patient on 7/30/2021  Reason for Visit:: Nerve pain  When problem began:: 7/4/2021  How problem occurred:: During an exercise workout  Number scale: 6/10  General health as reported by patient: good  Please check all that apply to your current or past medical history: thyroid problems  Medical allergies: none  Surgeries: none  Medications you are currently taking: thyroid medication, other  Other Meds Detail: Letrozole  Occupation:: Student  What are your primary job tasks: computer work, other    Static Posture  Standing posture: Lumbar lordosis  Sitting posture: Good     Dynamic Movement Screen  Double limb squat observations: Good technique/no significant findings  Single limb squat observations: Not assessed  Gait: No significant findings    Hip Joint Screen WNL    Trunk Range of Motion  Movement Loss Major Moderate Minimal Nil Pain   Flexion x    x   Extension     x   Sidebending R     x   Sidebending L    x      Test Movements   Symptoms During Testing Symptoms After Testing   Pretest symptoms lying     Rep EIL Produces Better     Flexibility Hamstrings   Left Severe 30 post press ups 38   Right None/WNL 70     Special Tests  Reflexes: WNL  MINNIE: + left       Assessment/Plan:  Patient is a 46 year old female with lumbar complaints.    Patient has the following significant findings with corresponding treatment plan.                Diagnosis 1:  Low back pain  Pain -  hot/cold therapy, US, electric stimulation, mechanical traction, self management, education, directional preference exercise and home program  Decreased ROM/flexibility - manual therapy and therapeutic exercise  Decreased strength - therapeutic exercise and therapeutic activities    Therapy Evaluation Codes:   Cumulative Therapy Evaluation is: Low complexity.    Previous and current functional limitations:  (See  Goal Flow Sheet for this information)    Short term and Long term goals: (See Goal Flow Sheet for this information)     Communication ability:  Patient appears to be able to clearly communicate and understand verbal and written communication and follow directions correctly.  Treatment Explanation - The following has been discussed with the patient:   RX ordered/plan of care  Anticipated outcomes  Possible risks and side effects  This patient would benefit from PT intervention to resume normal activities.   Rehab potential is excellent.    Frequency:  1 X week, once daily  Duration:  for 2 months  Discharge Plan:  Achieve all LTG.  Independent in home treatment program.  Reach maximal therapeutic benefit.    Please refer to the daily flowsheet for treatment today, total treatment time and time spent performing 1:1 timed codes.

## 2021-08-03 ENCOUNTER — VIRTUAL VISIT (OUTPATIENT)
Dept: ORTHOPEDICS | Facility: CLINIC | Age: 46
End: 2021-08-03
Payer: COMMERCIAL

## 2021-08-03 DIAGNOSIS — M54.50 ACUTE LOW BACK PAIN, UNSPECIFIED BACK PAIN LATERALITY, UNSPECIFIED WHETHER SCIATICA PRESENT: Primary | ICD-10-CM

## 2021-08-03 PROCEDURE — 99212 OFFICE O/P EST SF 10 MIN: CPT | Mod: TEL | Performed by: FAMILY MEDICINE

## 2021-08-03 NOTE — LETTER
8/3/2021      RE: Karishma Saucedo  [address]  [facility] [address]         [facility]  SPORTS & ORTHOPEDIC CLINIC VISIT     Aug 3, 2021        Karishma is a 46 year old who is being evaluated via a billable telephone visit.      What phone number would you like to be contacted at? [phone]  How would you like to obtain your AVS? LUVHANhart  Phone call duration: 13 minutes    Last seen 7/23/2021. Took prednisone and has been using salonpas patches which has been helpful. Was very helpful initially but pain has come back some since prednisone completed. Has been doing PT and finds this helpful. Has further appointments scheduled until September. Having some pain radiating down posterior Left thigh for the last two days. Does not go past knee.  Has continued to use tylenol and salonpas. Overall, improved from previous visit.   We stressed options for treatment at this time which includes continued management with physical therapy, Tylenol, Salonpas versus pursuing advanced imaging with MRI.  At this time the patient like to continue with her current treatment regimen.  However, if her improvement stalls or symptoms recur we would consider MRI of the lumbar spine.  She will contact us if this should occur.      Sanford Engel MD

## 2021-08-03 NOTE — PROGRESS NOTES
[facility]  SPORTS & ORTHOPEDIC CLINIC VISIT     Aug 3, 2021        Karishma is a 46 year old who is being evaluated via a billable telephone visit.      What phone number would you like to be contacted at? [phone]  How would you like to obtain your AVS? Ama  Phone call duration: 13 minutes    Last seen 7/23/2021. Took prednisone and has been using salonpas patches which has been helpful. Was very helpful initially but pain has come back some since prednisone completed. Has been doing PT and finds this helpful. Has further appointments scheduled until September. Having some pain radiating down posterior Left thigh for the last two days. Does not go past knee.  Has continued to use tylenol and salonpas. Overall, improved from previous visit.   We stressed options for treatment at this time which includes continued management with physical therapy, Tylenol, Salonpas versus pursuing advanced imaging with MRI.  At this time the patient like to continue with her current treatment regimen.  However, if her improvement stalls or symptoms recur we would consider MRI of the lumbar spine.  She will contact us if this should occur.      Sanford Engel MD

## 2021-08-03 NOTE — LETTER
8/3/2021       RE: Karishma Saucedo  [address]  [facility] [address]     Dear Colleague,    Thank you for referring your patient, Karishma Saucedo, to the [facility] SPORTS MEDICINE CLINIC [place] [facility]. Please see a copy of my visit note below.      [facility]  SPORTS & ORTHOPEDIC CLINIC VISIT     Aug 3, 2021        Karishma is a 46 year old who is being evaluated via a billable telephone visit.      What phone number would you like to be contacted at? [phone]  How would you like to obtain your AVS? Polleverywhere  Phone call duration: 13 minutes    Last seen 7/23/2021. Took prednisone and has been using salonpas patches which has been helpful. Was very helpful initially but pain has come back some since prednisone completed. Has been doing PT and finds this helpful. Has further appointments scheduled until September. Having some pain radiating down posterior Left thigh for the last two days. Does not go past knee.  Has continued to use tylenol and salonpas. Overall, improved from previous visit.   We stressed options for treatment at this time which includes continued management with physical therapy, Tylenol, Salonpas versus pursuing advanced imaging with MRI.  At this time the patient like to continue with her current treatment regimen.  However, if her improvement stalls or symptoms recur we would consider MRI of the lumbar spine.  She will contact us if this should occur.      Sanford Engel MD

## 2021-08-12 ENCOUNTER — THERAPY VISIT (OUTPATIENT)
Dept: PHYSICAL THERAPY | Facility: CLINIC | Age: 46
End: 2021-08-12
Payer: COMMERCIAL

## 2021-08-12 DIAGNOSIS — M54.50 LOW BACK PAIN, UNSPECIFIED BACK PAIN LATERALITY, UNSPECIFIED CHRONICITY, UNSPECIFIED WHETHER SCIATICA PRESENT: Primary | ICD-10-CM

## 2021-08-12 PROCEDURE — 97112 NEUROMUSCULAR REEDUCATION: CPT | Mod: GP | Performed by: PHYSICAL THERAPIST

## 2021-08-12 PROCEDURE — 97110 THERAPEUTIC EXERCISES: CPT | Mod: GP | Performed by: PHYSICAL THERAPIST

## 2021-08-23 ENCOUNTER — THERAPY VISIT (OUTPATIENT)
Dept: PHYSICAL THERAPY | Facility: CLINIC | Age: 46
End: 2021-08-23
Payer: COMMERCIAL

## 2021-08-23 DIAGNOSIS — M54.50 LOW BACK PAIN, UNSPECIFIED BACK PAIN LATERALITY, UNSPECIFIED CHRONICITY, UNSPECIFIED WHETHER SCIATICA PRESENT: ICD-10-CM

## 2021-08-23 PROCEDURE — 97112 NEUROMUSCULAR REEDUCATION: CPT | Mod: GP | Performed by: PHYSICAL THERAPIST

## 2021-08-23 PROCEDURE — 97110 THERAPEUTIC EXERCISES: CPT | Mod: GP | Performed by: PHYSICAL THERAPIST

## 2021-08-30 ENCOUNTER — TELEPHONE (OUTPATIENT)
Dept: OBGYN | Facility: CLINIC | Age: 46
End: 2021-08-30

## 2021-08-30 NOTE — TELEPHONE ENCOUNTER
Spoke with patient, informed patient that provider would be out on date of scheduled appointment on 10/20. Informed patient that next available with Dr. Pearson was 12/22. Patient was frustrated that appointment was needing to be pushed back but did not want to schedule with another provider. Added to waitlist.    Jenna Hickman  Clinical Services Assistant

## 2021-09-02 ENCOUNTER — OFFICE VISIT (OUTPATIENT)
Dept: INTERNAL MEDICINE | Facility: CLINIC | Age: 46
End: 2021-09-02
Payer: COMMERCIAL

## 2021-09-02 ENCOUNTER — LAB (OUTPATIENT)
Dept: LAB | Facility: CLINIC | Age: 46
End: 2021-09-02

## 2021-09-02 VITALS
DIASTOLIC BLOOD PRESSURE: 69 MMHG | WEIGHT: 128 LBS | OXYGEN SATURATION: 99 % | SYSTOLIC BLOOD PRESSURE: 98 MMHG | HEART RATE: 83 BPM | BODY MASS INDEX: 23.41 KG/M2

## 2021-09-02 DIAGNOSIS — E03.9 HYPOTHYROIDISM, UNSPECIFIED TYPE: ICD-10-CM

## 2021-09-02 LAB
T3 SERPL-MCNC: 147 NG/DL (ref 60–181)
T4 FREE SERPL-MCNC: 1.27 NG/DL (ref 0.76–1.46)
TSH SERPL DL<=0.005 MIU/L-ACNC: <0.01 MU/L (ref 0.4–4)

## 2021-09-02 PROCEDURE — 99213 OFFICE O/P EST LOW 20 MIN: CPT | Mod: GC | Performed by: INTERNAL MEDICINE

## 2021-09-02 PROCEDURE — 84439 ASSAY OF FREE THYROXINE: CPT | Performed by: PATHOLOGY

## 2021-09-02 PROCEDURE — 36415 COLL VENOUS BLD VENIPUNCTURE: CPT | Performed by: PATHOLOGY

## 2021-09-02 PROCEDURE — 84480 ASSAY TRIIODOTHYRONINE (T3): CPT | Mod: 90 | Performed by: PATHOLOGY

## 2021-09-02 PROCEDURE — 84443 ASSAY THYROID STIM HORMONE: CPT | Performed by: PATHOLOGY

## 2021-09-02 ASSESSMENT — PAIN SCALES - GENERAL: PAINLEVEL: NO PAIN (0)

## 2021-09-02 NOTE — PROGRESS NOTES
PRIMARY CARE CENTER       SUBJECTIVE:  Karishma Saucedo is a 46 year old female with a PMHx of Hashimoto's hypothyroidism and endometriosis who is presenting today for a follow up on her hypothyroidism.    Patient was diagnosed with  hypothyroidism due to Hashimoto's several years ago and has been following up wit [facility]. She has been on levothyroxine 112 mcg po daily and last TSH was 4.3(5/4/2021)     Today she reported no changes in her health. She has been feeling fatigued but related this to her lower back pain and left sided radiculopathy. She otherwise feeling okay with no reported cold/heat intolerance, good appetite, no weight loss or gain, but has lost weight since she was started on levothyroxine, which now is stable. No constipation.         Medications and allergies reviewed by me today.     ROS:   Constitutional, neuro, ENT, endocrine, pulmonary, cardiac, gastrointestinal, genitourinary, musculoskeletal, integument and psychiatric systems are negative, except as otherwise noted.    OBJECTIVE:    BP 98/69 (BP Location: Right arm, Patient Position: Sitting, Cuff Size: Adult Regular)   Pulse 83   Wt 58.1 kg (128 lb)   LMP 08/01/2021   SpO2 99%   BMI 23.41 kg/m     Wt Readings from Last 1 Encounters:   09/02/21 58.1 kg (128 lb)     Physical Exam:  General: AAOx3, NAD  HEENT: Oral mucosa moist and non-erythematous, PERRLA, EOM intact  CV: RRR, normal S1S2, no murmur, clicks, rubs  Resp: Clear to auscultation bilaterally, no wheezes, rhonchi  Abd: Soft, non-tender, BS+, no masses appreciated  Extremities: Radial and pedal pulses intact and symmetric, no pedal edema  Neuro: No lateralizing symptoms or focal neurologic deficits       ASSESSMENT/PLAN:    Karishma was seen today for recheck medication.    Diagnoses and all orders for this visit:    Subclinical hypothyroidism  Patient currently feeling fine and asymptomatic for her hypothyroidism. Last TSH was 4.3 on 5/2021 at [facility] and has been on stable dose of levothyroxine at 112 mcg daily. Her TSH here today is <0.01, but there has been a discrepancy between our lab and [facility] lab. (ours showed same results when patient had a TSH of ~ 11 at [facility] back on 4/2019.  Free T4 and T3 are normal though. Will advice patient to follow up with [facility] as she has been doing well on same dose of levothyroxine since she had her 4.3 level on 5/2021.    Colon cancer screening    -     Adult Gastro Ref - Procedure Only; Future    Pt should return to clinic for f/u with me in 6 months    Deny Alvarez MD  IM PGY-3  Sep 2, 2021    Pt was seen and plan of care discussed with Dr. Mendoza

## 2021-09-03 ENCOUNTER — THERAPY VISIT (OUTPATIENT)
Dept: PHYSICAL THERAPY | Facility: CLINIC | Age: 46
End: 2021-09-03
Payer: COMMERCIAL

## 2021-09-03 DIAGNOSIS — M54.50 LOW BACK PAIN, UNSPECIFIED BACK PAIN LATERALITY, UNSPECIFIED CHRONICITY, UNSPECIFIED WHETHER SCIATICA PRESENT: ICD-10-CM

## 2021-09-03 PROCEDURE — 97110 THERAPEUTIC EXERCISES: CPT | Mod: GP | Performed by: PHYSICAL THERAPIST

## 2021-09-03 PROCEDURE — 97530 THERAPEUTIC ACTIVITIES: CPT | Mod: GP | Performed by: PHYSICAL THERAPIST

## 2021-09-03 NOTE — PROGRESS NOTES
"Therapist Impression:   Overall, good hip/core strength.  Some pain with 10lbs deadlift.  Recommend starting with her beginner routine for a few weeks and then can progress to intermediate as symptoms allow.    GOALS: Return to weight training    NEXT: Reassess status, OSWESTRY    PTRX: NA    Subjective:  Reports being at 90%.  Pain is posterior.      Objective:  Hip and Knee Strength   MMT Hip Abduction Hip Extension   Left 22 21   Right 22 24     Prone plank and side planks x 30\" good technique    Good squatting mechanics.           "

## 2021-09-07 ENCOUNTER — LAB (OUTPATIENT)
Dept: LAB | Facility: CLINIC | Age: 46
End: 2021-09-07
Payer: COMMERCIAL

## 2021-09-07 PROCEDURE — 300N000013 SPECIMEN LABEL

## 2021-09-07 PROCEDURE — 36415 COLL VENOUS BLD VENIPUNCTURE: CPT

## 2021-10-03 ENCOUNTER — HEALTH MAINTENANCE LETTER (OUTPATIENT)
Age: 46
End: 2021-10-03

## 2021-10-18 ENCOUNTER — THERAPY VISIT (OUTPATIENT)
Dept: PHYSICAL THERAPY | Facility: CLINIC | Age: 46
End: 2021-10-18
Payer: COMMERCIAL

## 2021-10-18 DIAGNOSIS — M54.50 LOW BACK PAIN, UNSPECIFIED BACK PAIN LATERALITY, UNSPECIFIED CHRONICITY, UNSPECIFIED WHETHER SCIATICA PRESENT: ICD-10-CM

## 2021-10-18 PROCEDURE — 97530 THERAPEUTIC ACTIVITIES: CPT | Mod: GP | Performed by: PHYSICAL THERAPIST

## 2021-10-18 NOTE — PROGRESS NOTES
PROGRESS REPORT    Karishma has been in therapy from August 2, 2021 to Oct 18, 2021 for treatment of Low Back Pain.    Therapist Impression:   Overall, doing well.  Reports a new pelvic pain as a result from long driving.  Suspect low back in origin at this point as SI, hip, neural tension tests, piriformis tests were negative.  Follow up with [facility] in 3-4 weeks if not improving.    GOALS: Return to weight training    NEXT: Reassess status    PTRX: NA     Subjective:  Long car ride/sitting brought on symptoms.  Feels it in pelvic region.  Symptoms are also in back.  Feels more tight.  More pain in pelvic region.  Feels more in the morning.  This is all new after the driving    Objective:  Trunk Range of Motion  Movement Loss Major Moderate Minimal Nil Pain   Flexion    x x   Extension    x    Sidebending R        Sidebending L        Side Gliding R        Side Gliding L          RFIS- no change  CECY- no change  REIL - no pelvic pain after  Negative SI joint compression and thrust and MINNIE  Negative piriformis test  Negative hip joint screen    ASSESSMENT/PLAN  Updated problem list and treatment plan: The encounter diagnosis was Low back pain, unspecified back pain laterality, unspecified chronicity, unspecified whether sciatica present. Pain - HEP  Decreased ROM/flexibility - HEP  Decreased function - HEP  Decreased strength - HEP  STG/LTGs have been met or progress has been made towards goals:  Yes (See Goal flow sheet completed today.)  Assessment of Progress: The patient's condition is improving.  Self Management Plans:  Patient has been instructed in a home treatment program.  Patient  has been instructed in self management of symptoms.  I have re-evaluated this patient and find that the nature, scope, duration and intensity of the therapy is appropriate for the medical condition of the patient.  Karishma continues to require the following intervention to meet STG and LTG's:   PT    Recommendations:  This patient would benefit from continued therapy.     Frequency:  2 X a month, once daily  Duration:  for 2 months              Please refer to the daily flowsheet for treatment today, total treatment time and time spent performing 1:1 timed codes.

## 2021-11-19 ENCOUNTER — THERAPY VISIT (OUTPATIENT)
Dept: PHYSICAL THERAPY | Facility: CLINIC | Age: 46
End: 2021-11-19
Payer: COMMERCIAL

## 2021-11-19 DIAGNOSIS — M54.50 LOW BACK PAIN, UNSPECIFIED BACK PAIN LATERALITY, UNSPECIFIED CHRONICITY, UNSPECIFIED WHETHER SCIATICA PRESENT: ICD-10-CM

## 2021-11-19 PROCEDURE — 97530 THERAPEUTIC ACTIVITIES: CPT | Mod: GP | Performed by: PHYSICAL THERAPIST

## 2021-11-19 PROCEDURE — 97140 MANUAL THERAPY 1/> REGIONS: CPT | Mod: GP | Performed by: PHYSICAL THERAPIST

## 2021-11-19 PROCEDURE — 97112 NEUROMUSCULAR REEDUCATION: CPT | Mod: GP | Performed by: PHYSICAL THERAPIST

## 2021-11-19 NOTE — PROGRESS NOTES
Subjective:  HPI  Physical Exam                    Objective:  System    Physical Exam    General     ROS      PROGRESS  REPORT    11/19/2021     SUBJECTIVE  Subjective: Pt reports overall back pain is much improved. However, about a month ago, went on a road trip and started having discomfort in pelvic region. She gets pain with sitting. Pain is typically on the L side. Pt reports that she has had new incomplete bladder emptying.     Changes in function:  Yes (See Goal flowsheet attached for changes in current functional level)  Adverse reaction to treatment or activity: None    OBJECTIVE  Changes noted in objective findings:  Yes,     Baseline PF tone: Hyper on the L   PF Tone with cough: WNL  Valsalva: not tested  PF Response quality: moderate  PF Power: Center: 2   Endurance: Maximum contraction in seconds: 8 seconds  # of endurance contractions before fatigue: NT  Quick contraction repetitions prior to fatigue: 6 with verbal cues; extra time needed for relaxation   Specificity/accessory muscles: Overall good isolation      Objective: Trial of single limb extension as pt appears to continue to respond to extension-based program. Pt education on pelvic floor and pelvic floor muscle assessment completed.      ASSESSMENT/PLAN  Updated problem list and treatment plan: Diagnosis 1:  Low back pain   Pain -  manual therapy, self management and home program  Impaired muscle performance - neuro re-education and home program  Decreased function - therapeutic activities and home program  STG/LTGs have been met or progress has been made towards goals:  Yes (See Goal flow sheet completed today.)  Assessment of Progress: The patient's condition is improving.  Self Management Plans:  Patient has been instructed in a home treatment program.  I have re-evaluated this patient and find that the nature, scope, duration and intensity of the therapy is appropriate for the medical condition of the patient.  Karishma continues to  require the following intervention to meet STG and LTG's:  PT    Recommendations:  This patient would benefit from continued therapy.     Frequency:  2 X a month, once daily  Duration:  for 1-2 months        Please refer to the daily flowsheet for treatment today, total treatment time and time spent performing 1:1 timed codes.

## 2021-11-29 ENCOUNTER — LAB (OUTPATIENT)
Dept: LAB | Facility: CLINIC | Age: 46
End: 2021-11-29
Attending: PHYSICIAN ASSISTANT
Payer: COMMERCIAL

## 2021-11-29 DIAGNOSIS — Z20.822 ENCOUNTER FOR LABORATORY TESTING FOR COVID-19 VIRUS: ICD-10-CM

## 2021-11-29 LAB — SARS-COV-2 RNA RESP QL NAA+PROBE: NORMAL

## 2021-11-29 PROCEDURE — 99000 SPECIMEN HANDLING OFFICE-LAB: CPT

## 2021-11-29 PROCEDURE — U0005 INFEC AGEN DETEC AMPLI PROBE: HCPCS | Mod: 90

## 2021-11-29 PROCEDURE — U0003 INFECTIOUS AGENT DETECTION BY NUCLEIC ACID (DNA OR RNA); SEVERE ACUTE RESPIRATORY SYNDROME CORONAVIRUS 2 (SARS-COV-2) (CORONAVIRUS DISEASE [COVID-19]), AMPLIFIED PROBE TECHNIQUE, MAKING USE OF HIGH THROUGHPUT TECHNOLOGIES AS DESCRIBED BY CMS-2020-01-R: HCPCS | Mod: 90

## 2021-11-30 LAB — SARS-COV-2 RNA RESP QL NAA+PROBE: NOT DETECTED

## 2021-12-21 ASSESSMENT — ANXIETY QUESTIONNAIRES
4. TROUBLE RELAXING: NOT AT ALL
GAD7 TOTAL SCORE: 0
6. BECOMING EASILY ANNOYED OR IRRITABLE: NOT AT ALL
3. WORRYING TOO MUCH ABOUT DIFFERENT THINGS: NOT AT ALL
7. FEELING AFRAID AS IF SOMETHING AWFUL MIGHT HAPPEN: NOT AT ALL
2. NOT BEING ABLE TO STOP OR CONTROL WORRYING: NOT AT ALL
7. FEELING AFRAID AS IF SOMETHING AWFUL MIGHT HAPPEN: NOT AT ALL
5. BEING SO RESTLESS THAT IT IS HARD TO SIT STILL: NOT AT ALL
GAD7 TOTAL SCORE: 0
1. FEELING NERVOUS, ANXIOUS, OR ON EDGE: NOT AT ALL

## 2021-12-21 ASSESSMENT — ENCOUNTER SYMPTOMS
SKIN CHANGES: 0
POOR WOUND HEALING: 0
HOT FLASHES: 0
NAIL CHANGES: 0
DECREASED LIBIDO: 1

## 2021-12-22 ENCOUNTER — OFFICE VISIT (OUTPATIENT)
Dept: OBGYN | Facility: CLINIC | Age: 46
End: 2021-12-22
Attending: OBSTETRICS & GYNECOLOGY
Payer: COMMERCIAL

## 2021-12-22 VITALS
WEIGHT: 129.2 LBS | HEIGHT: 62 IN | BODY MASS INDEX: 23.77 KG/M2 | HEART RATE: 100 BPM | SYSTOLIC BLOOD PRESSURE: 118 MMHG | DIASTOLIC BLOOD PRESSURE: 77 MMHG

## 2021-12-22 DIAGNOSIS — N92.0 MENORRHAGIA WITH REGULAR CYCLE: Primary | ICD-10-CM

## 2021-12-22 DIAGNOSIS — N80.9 ENDOMETRIOSIS: ICD-10-CM

## 2021-12-22 DIAGNOSIS — Z30.431 SURVEILLANCE OF INTRAUTERINE CONTRACEPTIVE DEVICE: ICD-10-CM

## 2021-12-22 DIAGNOSIS — B37.31 YEAST INFECTION OF THE VAGINA: ICD-10-CM

## 2021-12-22 LAB
CLUE CELLS: NEGATIVE
TRICHOMONAS (WET PREP): NEGATIVE
WBC (WET PREP): ABNORMAL
YEAST (WET PREP): POSITIVE

## 2021-12-22 PROCEDURE — 87210 SMEAR WET MOUNT SALINE/INK: CPT | Performed by: OBSTETRICS & GYNECOLOGY

## 2021-12-22 PROCEDURE — G0463 HOSPITAL OUTPT CLINIC VISIT: HCPCS

## 2021-12-22 PROCEDURE — 99214 OFFICE O/P EST MOD 30 MIN: CPT | Performed by: OBSTETRICS & GYNECOLOGY

## 2021-12-22 RX ORDER — FLUCONAZOLE 150 MG/1
150 TABLET ORAL ONCE
Qty: 1 TABLET | Refills: 3 | Status: SHIPPED | OUTPATIENT
Start: 2021-12-22 | End: 2021-12-22

## 2021-12-22 RX ORDER — NORELGESTROMIN AND ETHINYL ESTRADIOL 35; 150 UG/MG; UG/MG
PATCH TRANSDERMAL
Qty: 16 PATCH | Refills: 3 | Status: SHIPPED | OUTPATIENT
Start: 2021-12-22 | End: 2022-12-14

## 2021-12-22 RX ORDER — LETROZOLE 2.5 MG/1
2.5 TABLET, FILM COATED ORAL DAILY
Qty: 90 TABLET | Refills: 3 | Status: SHIPPED | OUTPATIENT
Start: 2021-12-22

## 2021-12-22 ASSESSMENT — ANXIETY QUESTIONNAIRES
3. WORRYING TOO MUCH ABOUT DIFFERENT THINGS: NOT AT ALL
5. BEING SO RESTLESS THAT IT IS HARD TO SIT STILL: NOT AT ALL
GAD7 TOTAL SCORE: 0
2. NOT BEING ABLE TO STOP OR CONTROL WORRYING: NOT AT ALL
7. FEELING AFRAID AS IF SOMETHING AWFUL MIGHT HAPPEN: NOT AT ALL
GAD7 TOTAL SCORE: 0
6. BECOMING EASILY ANNOYED OR IRRITABLE: NOT AT ALL
1. FEELING NERVOUS, ANXIOUS, OR ON EDGE: NOT AT ALL

## 2021-12-22 ASSESSMENT — PATIENT HEALTH QUESTIONNAIRE - PHQ9
5. POOR APPETITE OR OVEREATING: NOT AT ALL
SUM OF ALL RESPONSES TO PHQ QUESTIONS 1-9: 0

## 2021-12-22 ASSESSMENT — MIFFLIN-ST. JEOR: SCORE: 1184.3

## 2021-12-22 NOTE — LETTER
12/22/2021       RE: Karishma Saucedo  [address]  [place] [address]     Dear Colleague,    Thank you for referring your patient, Karishma Saucedo, to the [facility] Onset at [facility]. Please see a copy of my visit note below.    CC/HPI:   Karishma Saucedo is a 47 yo with history of severe endometriosis controlled on ortho evra with letrozole. Had been using continuously but experienced some breakthrough bleeding so now is using it cyclically.   Concern for yeast infection: tried OTC monistat with some improvement  Undergoing pelvic floor PT    HISTORIES:  Patient Active Problem List   Diagnosis     Seasonal allergies     Hypothyroidism, Dx 2004     Hair loss     Contraception     Thyrotoxicosis, exogenous iatrogenic     Endometriosis     Effusion of lower leg joint     Chronic urticaria     Low back pain, unspecified back pain laterality, unspecified chronicity, unspecified whether sciatica present     Past Medical History:   Diagnosis Date     Autoimmune hypothyroidism 2005     Dysmenorrhea      Endometriosis Na     Past Surgical History:   Procedure Laterality Date     NO HISTORY OF SURGERY       Current Outpatient Medications   Medication Sig Dispense Refill     letrozole (FEMARA) 2.5 MG tablet Take 1 tablet (2.5 mg) by mouth daily 90 tablet 3     norelgestromin-ethinyl estradiol (ORTHO EVRA) 150-35 MCG/24HR patch Continuous use, no weeks off 16 patch 3     budesonide-formoterol (SYMBICORT) 160-4.5 MCG/ACT Inhaler Inhale 2 puffs into the lungs 2 times daily 10.2 g 3     Cholecalciferol (VITAMIN D) 2000 UNITS CAPS Take 2 tablets by mouth daily       cyclobenzaprine (FLEXERIL) 5 MG tablet Take 1 tablet (5 mg) by mouth 2 times daily as needed for muscle spasms 28 tablet 0     fexofenadine (ALLEGRA) 180 MG tablet Take 1 tablet (180 mg) by mouth daily 30 tablet 3     fexofenadine (ALLEGRA) 180 MG tablet Take 1 tablet  (180 mg) by mouth 2 times daily 90 tablet 3     hydrOXYzine (ATARAX) 25 MG tablet Take 1 tablet (25 mg) by mouth 3 times daily as needed for itching 30 tablet 3     montelukast (SINGULAIR) 10 MG tablet Take 1 tablet (10 mg) by mouth At Bedtime 60 tablet 3     predniSONE (DELTASONE) 20 MG tablet Take two tablets (= 40mg) each day for 5 (five) days 10 tablet 0     SYNTHROID 75 MCG tablet MON to SAT 1 tablet/day; SUN 0.5 tablet (Patient taking differently: 115 mcg MON to SAT 1 tablet/day; SUN 0.5 tablet) 90 tablet 3     vitamin B complex with vitamin C (VITAMIN  B COMPLEX) TABS tablet Take 1 tablet by mouth daily       Allergies   Allergen Reactions     Seasonal Allergies Itching     Augmentin Nausea and Vomiting     Shellfish Allergy Hives     Social History     Socioeconomic History     Marital status:      Spouse name: Not on file     Number of children: Not on file     Years of education: Not on file     Highest education level: Not on file   Occupational History     Occupation: dept anesthesia      Comment: staffing   Tobacco Use     Smoking status: Never Smoker     Smokeless tobacco: Never Used   Substance and Sexual Activity     Alcohol use: Yes     Alcohol/week: 5.0 standard drinks     Comment: 1-2 drinks 3 days/week     Drug use: No     Sexual activity: Yes     Partners: Male     Birth control/protection: Pill, Patch     Comment: Nuvaring in the past   Other Topics Concern      Service Not Asked     Blood Transfusions Not Asked     Caffeine Concern Not Asked     Occupational Exposure Not Asked     Hobby Hazards Not Asked     Sleep Concern Not Asked     Stress Concern Not Asked     Weight Concern Not Asked     Special Diet Not Asked     Back Care Not Asked     Exercise Yes     Comment: cardio/swim/weights     Bike Helmet Not Asked     Seat Belt Not Asked     Self-Exams Not Asked   Social History Narrative    6/24/20            5/10/19    [facility]    [place] into life in [place]    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    Hoda Tamra Pearson            2016:     working for University, psychiatry research    Moved from [place]    She is a nurse by training    , no kids    Working in Nursing schools, doing executive masters         (1 miscarriage)    No STIs    Menses regular    Pap--none abnormal    PAP      NIL   2014         Social Determinants of Health     Financial Resource Strain: Not on file   Food Insecurity: Not on file   Transportation Needs: Not on file   Physical Activity: Not on file   Stress: Not on file   Social Connections: Not on file   Intimate Partner Violence: Not on file   Housing Stability: Not on file     Family History   Problem Relation Age of Onset     Thyroid Disease Maternal Uncle      Diabetes Mother      Hypertension Maternal Uncle      Diabetes Father      Thyroid Disease Father      Other Cancer Father         glioblastoma, had surgery, 75 yo     Parkinsonism Father      Thyroid Disease Other      Hyperlipidemia Other      Cancer No family hx of           Gyn Hx:   Patient's last menstrual period was 2021.  Menses:   On ortho evra    Review Of Systems:  The patient will be notified of any results via [employer].  Answers for HPI/ROS submitted by the patient on 2021  MICHELLE 7 TOTAL SCORE: 0  General Symptoms: No  Skin Symptoms: Yes  HENT Symptoms: No  EYE SYMPTOMS: No  HEART SYMPTOMS: No  LUNG SYMPTOMS: No  INTESTINAL SYMPTOMS: No  URINARY SYMPTOMS: No  GYNECOLOGIC SYMPTOMS: Yes  BREAST SYMPTOMS: No  SKELETAL SYMPTOMS: No  BLOOD SYMPTOMS: No  NERVOUS SYSTEM SYMPTOMS: No  MENTAL HEALTH SYMPTOMS: No  Changes in hair: No  Changes in moles/birth marks: No  Itching: No  Rashes: No  Changes in nails: No  Acne: No  Hair in places you don't want it: No  Change in facial hair: No  Warts: No  Non-healing sores: No  Scarring: No  Flaking of skin: No  Color changes of hands/feet in cold : No  Sun sensitivity: No  Skin thickening: No  Bleeding or spotting  "between periods: Yes  Heavy or painful periods: Yes  Irregular periods: No  Vaginal discharge: Yes  Hot flashes: No  Vaginal dryness: Yes  Genital ulcers: No  Reduced libido: Yes  Painful intercourse: Yes  Difficulty with sexual arousal: No  Post-menopausal bleeding: No          EXAM:  /77   Pulse 100   Ht 1.575 m (5' 2\")   Wt 58.6 kg (129 lb 3.2 oz)   LMP 12/22/2021   BMI 23.63 kg/m    Body mass index is 23.63 kg/m .    General - pleasant female in no acute distress.  Skin - no suspicious lesions or rashes  EENT-  PERRLA, euthyroid with out palpable nodules  Neck - supple without lymphadenopathy.  Lungs - clear to auscultation bilaterally.  Heart - regular rate and rhythm without murmur.  Breasts- symmetrical . No dominant fixed or suspicious masses noted.  No skin or nipple changes or axillary nodes. Self exam is taught and encouraged monthly.  Abdomen - soft, nontender, nondistended, no masses or organomegaly noted.  Musculoskeletal - no gross deformities.  Neurological - normal strength, sensation, and mental status.  Pelvic - EG: normal  female, vulva reveals no erythema or lesions.   BUS: within normal limits.  Vagina: well rugated, no lesions polyps or suspicious  discharge.     Cervix: no lesions, polyps discharge or CMT.  Uterus: firm,  anteverted, normal size and nontender.  Adnexa: no masses or tenderness.  Anus- normal, no lesions.  Rectovaginal - deferred.    ASSESSMENT/PLAN  (N92.0) Menorrhagia with regular cycle  (primary encounter diagnosis)  Comment:   Plan: US Pelvic Complete with Transvaginal            (B37.3) Yeast infection of the vagina  Comment:   Plan: Wet Prep POCT, Enter/Edit Result, fluconazole         (DIFLUCAN) 150 MG tablet            (Z30.431) Surveillance of intrauterine contraceptive device  Comment:   Plan: norelgestromin-ethinyl estradiol (ORTHO EVRA)         150-35 MCG/24HR patch            (N80.9) Endometriosis  Comment:   Plan: norelgestromin-ethinyl estradiol (ORTHO " EVRA)         150-35 MCG/24HR patch, letrozole (FEMARA) 2.5         MG tablet              Additional teaching done at this visit regarding . I have discussed with patient the harms and  benefits of  medications, treatment options.           Again, thank you for allowing me to participate in the care of your patient.      Sincerely,    Hoda Pearson MD

## 2021-12-22 NOTE — PROGRESS NOTES
CC/HPI:   Karishma Saucedo is a 45 yo with history of severe endometriosis controlled on ortho evra with letrozole. Had been using continuously but experienced some breakthrough bleeding so now is using it cyclically.   Concern for yeast infection: tried OTC monistat with some improvement  Undergoing pelvic floor PT    HISTORIES:  Patient Active Problem List   Diagnosis     Seasonal allergies     Hypothyroidism, Dx 2004     Hair loss     Contraception     Thyrotoxicosis, exogenous iatrogenic     Endometriosis     Effusion of lower leg joint     Chronic urticaria     Low back pain, unspecified back pain laterality, unspecified chronicity, unspecified whether sciatica present     Past Medical History:   Diagnosis Date     Autoimmune hypothyroidism 2005     Dysmenorrhea      Endometriosis Na     Past Surgical History:   Procedure Laterality Date     NO HISTORY OF SURGERY       Current Outpatient Medications   Medication Sig Dispense Refill     letrozole (FEMARA) 2.5 MG tablet Take 1 tablet (2.5 mg) by mouth daily 90 tablet 3     norelgestromin-ethinyl estradiol (ORTHO EVRA) 150-35 MCG/24HR patch Continuous use, no weeks off 16 patch 3     budesonide-formoterol (SYMBICORT) 160-4.5 MCG/ACT Inhaler Inhale 2 puffs into the lungs 2 times daily 10.2 g 3     Cholecalciferol (VITAMIN D) 2000 UNITS CAPS Take 2 tablets by mouth daily       cyclobenzaprine (FLEXERIL) 5 MG tablet Take 1 tablet (5 mg) by mouth 2 times daily as needed for muscle spasms 28 tablet 0     fexofenadine (ALLEGRA) 180 MG tablet Take 1 tablet (180 mg) by mouth daily 30 tablet 3     fexofenadine (ALLEGRA) 180 MG tablet Take 1 tablet (180 mg) by mouth 2 times daily 90 tablet 3     hydrOXYzine (ATARAX) 25 MG tablet Take 1 tablet (25 mg) by mouth 3 times daily as needed for itching 30 tablet 3     montelukast (SINGULAIR) 10 MG tablet Take 1 tablet (10 mg) by mouth At Bedtime 60 tablet 3     predniSONE (DELTASONE) 20 MG tablet Take two tablets (= 40mg) each day  for 5 (five) days 10 tablet 0     SYNTHROID 75 MCG tablet MON to SAT 1 tablet/day; SUN 0.5 tablet (Patient taking differently: 115 mcg MON to SAT 1 tablet/day; SUN 0.5 tablet) 90 tablet 3     vitamin B complex with vitamin C (VITAMIN  B COMPLEX) TABS tablet Take 1 tablet by mouth daily       Allergies   Allergen Reactions     Seasonal Allergies Itching     Augmentin Nausea and Vomiting     Shellfish Allergy Hives     Social History     Socioeconomic History     Marital status:      Spouse name: Not on file     Number of children: Not on file     Years of education: Not on file     Highest education level: Not on file   Occupational History     Occupation: dept anesthesia      Comment: staffing   Tobacco Use     Smoking status: Never Smoker     Smokeless tobacco: Never Used   Substance and Sexual Activity     Alcohol use: Yes     Alcohol/week: 5.0 standard drinks     Comment: 1-2 drinks 3 days/week     Drug use: No     Sexual activity: Yes     Partners: Male     Birth control/protection: Pill, Patch     Comment: Nuvaring in the past   Other Topics Concern      Service Not Asked     Blood Transfusions Not Asked     Caffeine Concern Not Asked     Occupational Exposure Not Asked     Hobby Hazards Not Asked     Sleep Concern Not Asked     Stress Concern Not Asked     Weight Concern Not Asked     Special Diet Not Asked     Back Care Not Asked     Exercise Yes     Comment: cardio/swim/weights     Bike Helmet Not Asked     Seat Belt Not Asked     Self-Exams Not Asked   Social History Narrative    6/24/20            5/10/19    [facility]    [place] into life in [place]    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    Hoda Pearson            2016:     working for [employer], psychiatry research    Moved from [place]    She is a nurse by training    , no kids    Working in Nursing schools, doing executive masters         (1 miscarriage)    No STIs    Menses regular    Pap--none abnormal    PAP    "   NIL   4/17/2014         Social Determinants of Health     Financial Resource Strain: Not on file   Food Insecurity: Not on file   Transportation Needs: Not on file   Physical Activity: Not on file   Stress: Not on file   Social Connections: Not on file   Intimate Partner Violence: Not on file   Housing Stability: Not on file     Family History   Problem Relation Age of Onset     Thyroid Disease Maternal Uncle      Diabetes Mother      Hypertension Maternal Uncle      Diabetes Father      Thyroid Disease Father      Other Cancer Father         glioblastoma, had surgery, 77 yo     Parkinsonism Father      Thyroid Disease Other      Hyperlipidemia Other      Cancer No family hx of           Gyn Hx:   Patient's last menstrual period was 12/22/2021.  Menses:   On ortho evra    Review Of Systems:  The patient will be notified of any results via [employer].  Answers for HPI/ROS submitted by the patient on 12/21/2021  MICHELLE 7 TOTAL SCORE: 0  General Symptoms: No  Skin Symptoms: Yes  HENT Symptoms: No  EYE SYMPTOMS: No  HEART SYMPTOMS: No  LUNG SYMPTOMS: No  INTESTINAL SYMPTOMS: No  URINARY SYMPTOMS: No  GYNECOLOGIC SYMPTOMS: Yes  BREAST SYMPTOMS: No  SKELETAL SYMPTOMS: No  BLOOD SYMPTOMS: No  NERVOUS SYSTEM SYMPTOMS: No  MENTAL HEALTH SYMPTOMS: No  Changes in hair: No  Changes in moles/birth marks: No  Itching: No  Rashes: No  Changes in nails: No  Acne: No  Hair in places you don't want it: No  Change in facial hair: No  Warts: No  Non-healing sores: No  Scarring: No  Flaking of skin: No  Color changes of hands/feet in cold : No  Sun sensitivity: No  Skin thickening: No  Bleeding or spotting between periods: Yes  Heavy or painful periods: Yes  Irregular periods: No  Vaginal discharge: Yes  Hot flashes: No  Vaginal dryness: Yes  Genital ulcers: No  Reduced libido: Yes  Painful intercourse: Yes  Difficulty with sexual arousal: No  Post-menopausal bleeding: No          EXAM:  /77   Pulse 100   Ht 1.575 m (5' 2\")   Wt " 58.6 kg (129 lb 3.2 oz)   LMP 12/22/2021   BMI 23.63 kg/m    Body mass index is 23.63 kg/m .    General - pleasant female in no acute distress.  Skin - no suspicious lesions or rashes  EENT-  PERRLA, euthyroid with out palpable nodules  Neck - supple without lymphadenopathy.  Lungs - clear to auscultation bilaterally.  Heart - regular rate and rhythm without murmur.  Breasts- symmetrical . No dominant fixed or suspicious masses noted.  No skin or nipple changes or axillary nodes. Self exam is taught and encouraged monthly.  Abdomen - soft, nontender, nondistended, no masses or organomegaly noted.  Musculoskeletal - no gross deformities.  Neurological - normal strength, sensation, and mental status.  Pelvic - EG: normal  female, vulva reveals no erythema or lesions.   BUS: within normal limits.  Vagina: well rugated, no lesions polyps or suspicious  discharge.     Cervix: no lesions, polyps discharge or CMT.  Uterus: firm,  anteverted, normal size and nontender.  Adnexa: no masses or tenderness.  Anus- normal, no lesions.  Rectovaginal - deferred.    ASSESSMENT/PLAN  (N92.0) Menorrhagia with regular cycle  (primary encounter diagnosis)  Comment:   Plan: US Pelvic Complete with Transvaginal            (B37.3) Yeast infection of the vagina  Comment:   Plan: Wet Prep POCT, Enter/Edit Result, fluconazole         (DIFLUCAN) 150 MG tablet            (Z30.431) Surveillance of intrauterine contraceptive device  Comment:   Plan: norelgestromin-ethinyl estradiol (ORTHO EVRA)         150-35 MCG/24HR patch            (N80.9) Endometriosis  Comment:   Plan: norelgestromin-ethinyl estradiol (ORTHO EVRA)         150-35 MCG/24HR patch, letrozole (FEMARA) 2.5         MG tablet              Additional teaching done at this visit regarding . I have discussed with patient the harms and  benefits of  medications, treatment options.     Hoda Pearson MD

## 2022-01-05 ENCOUNTER — ANCILLARY PROCEDURE (OUTPATIENT)
Dept: ULTRASOUND IMAGING | Facility: CLINIC | Age: 47
End: 2022-01-05
Attending: OBSTETRICS & GYNECOLOGY
Payer: COMMERCIAL

## 2022-01-05 DIAGNOSIS — N92.0 MENORRHAGIA WITH REGULAR CYCLE: ICD-10-CM

## 2022-01-05 PROCEDURE — 76830 TRANSVAGINAL US NON-OB: CPT | Mod: 26 | Performed by: OBSTETRICS & GYNECOLOGY

## 2022-01-05 PROCEDURE — 76830 TRANSVAGINAL US NON-OB: CPT

## 2022-01-20 ENCOUNTER — E-VISIT (OUTPATIENT)
Dept: URGENT CARE | Facility: URGENT CARE | Age: 47
End: 2022-01-20
Payer: COMMERCIAL

## 2022-01-20 DIAGNOSIS — Z20.822 SUSPECTED COVID-19 VIRUS INFECTION: Primary | ICD-10-CM

## 2022-01-20 PROCEDURE — 99421 OL DIG E/M SVC 5-10 MIN: CPT | Performed by: PHYSICIAN ASSISTANT

## 2022-01-21 ENCOUNTER — LAB (OUTPATIENT)
Dept: URGENT CARE | Facility: URGENT CARE | Age: 47
End: 2022-01-21
Attending: PHYSICIAN ASSISTANT
Payer: COMMERCIAL

## 2022-01-21 DIAGNOSIS — Z20.822 SUSPECTED COVID-19 VIRUS INFECTION: ICD-10-CM

## 2022-01-21 LAB
FLUAV AG SPEC QL IA: NEGATIVE
FLUBV AG SPEC QL IA: NEGATIVE
SARS-COV-2 RNA RESP QL NAA+PROBE: POSITIVE

## 2022-01-21 PROCEDURE — U0005 INFEC AGEN DETEC AMPLI PROBE: HCPCS

## 2022-01-21 PROCEDURE — U0003 INFECTIOUS AGENT DETECTION BY NUCLEIC ACID (DNA OR RNA); SEVERE ACUTE RESPIRATORY SYNDROME CORONAVIRUS 2 (SARS-COV-2) (CORONAVIRUS DISEASE [COVID-19]), AMPLIFIED PROBE TECHNIQUE, MAKING USE OF HIGH THROUGHPUT TECHNOLOGIES AS DESCRIBED BY CMS-2020-01-R: HCPCS

## 2022-01-21 PROCEDURE — 87804 INFLUENZA ASSAY W/OPTIC: CPT

## 2022-01-21 NOTE — PATIENT INSTRUCTIONS
Karishma,    Your symptoms show that you may have coronavirus (COVID-19). This illness can cause fever, cough and trouble breathing. Many people get a mild case and get better on their own. Some people can get very sick.    Because you reported additional symptoms, I would like to also test you for influenza.    What should I do?  We would like to test you for COVID-19 virus and influenza. I have placed orders for these tests.     For all employees or close contacts (except [place] and [employer] - see below), go to your [employer] home page and scroll down to the section that says  You have an appointment that needs to be scheduled  and click the large green button that says  Schedule Now  and follow the steps to find the next available openings. It is important that when you are asked what the reason for your appointment is that you mention you need BOTH COVID and influenza tests.    If you are unable to complete these steps or if you cannot find any available times, please call [phone] to schedule employee testing.     [place] employees or close contacts, please call [phone].   [place] ([employer]) employees or close contacts call [phone].    Return to work/school/ guidance:   Please let your workplace manager and staffing office know when your isolation ends.       If you receive a positive COVID-19 test result, follow the guidance of the those who are giving you the results. Usually the return to work is 10 days from symptom onset or positive test date, whichever comes first (or in some cases 20 days if you are immunocompromised). If your symptoms started after your positive test, the 10 days should start when your symptoms started.   o If you work at [employer], you must also be cleared by Employee Occupational Health and Safety to return to work.      If you receive a negative COVID-19 test result and did not have a high risk exposure to someone with a known positive COVID-19 test,  you can return to work once you're free of fever for 24 hours without fever-reducing medication and your symptoms are improving or resolved.    If you receive a negative COVID-19 test and if you had a high risk exposure to someone who has tested positive for COVID-19 then you can return to work 14 days after your last contact with the positive individual. Follow quarantine guidance given by your doctor or public health officials.    Sign up for [employer].   We know it's scary to hear that you might have COVID-19. We want to track your symptoms to make sure you're okay over the next 2 weeks. Please look for an email from [employer]--this is a free, online program that we'll use to keep in touch. To sign up, follow the link in the email you will receive. Learn more at [email or web address]    How can I take care of myself?  Over the counter medications may help with your symptoms like congestion, cough, chills, or fever.    There are not many effective prescription treatments for early COVID-19. Hydroxychloroquine, ivermectin, and azithromycin are not effective or recommended for COVID-19.    If your symptoms started in the last 10 days, you may be able to receive a treatment with monoclonal antibodies. This treatment can lower your risk of severe illness and going to the hospital. It is given through an IV or under your skin (subcutaneous) and must be given at an infusion center. You must be 12 or older, weight at least 88 pounds, and have a positive COVID-19 test.      If you would like to sign up to be considered to receive the monoclonal antibody medicine, please complete a participation form through the [facility] here:  MNRAP  ([email or web address]). You may also call the [facility] COVID-19 Public Hotline at 1-634.890.7097 (open Mon-Fri: 9am-7pm and Sat: 10am-6pm).     Not all people who are eligible will receive the medicine, since supply  is limited. You will be contacted in the next 1 to 2 business days only if you are selected. If you do not receive a call, you have not been selected to receive the medicine. If you have any questions about this medication, please contact your primary care provider. For more information, see [email or web address]      Get lots of rest. Drink extra fluids (unless a doctor has told you not to)    Take Tylenol (acetaminophen) or ibuprofen for fever or pain. If you have liver or kidney problems, ask your family doctor if it's okay to take Tylenol or ibuprofen    Take over the counter medications for your symptoms, as directed by your doctor. You may also talk to your pharmacist.      If you have other health problems (like cancer, heart failure, an organ transplant or severe kidney disease): Call your specialty clinic if you don't feel better in the next 2 days.    Know when to call 911. Emergency warning signs include:  o Trouble breathing or shortness of breath  o Pain or pressure in the chest that doesn't go away  o Feeling confused like you haven't felt before, or not being able to wake up  o Bluish-colored lips or face    Where can I get more information?    [facility] - About COVID-19: [email or web address]     CDC - What to Do If You're Sick:  www.cdc.gov/coronavirus/2019-ncov/about/steps-when-sick.html    CDC - Ending Home Isolation:  https://www.cdc.gov/coronavirus/2019-ncov/your-health/quarantine-isolation.html    CDC - Caring for Someone:  www.cdc.gov/coronavirus/2019-ncov/if-you-are-sick/care-for-someone.html    [facility] clinical trials (COVID-19 research studies): clinicalaffairs.[facility]/umn-clinical-trials    Below are the COVID-19 hotlines at the [facility] ([facility]). Interpreters are available.  o For health questions: Call 631-710-4407 or 1-965.390.5419 (7 a.m. to 7 p.m.)  o For questions about schools and childcare:  Call 153-871-4102 or 1-620.571.3128 (7 a.m. to 7 p.m.)

## 2022-02-22 ENCOUNTER — THERAPY VISIT (OUTPATIENT)
Dept: PHYSICAL THERAPY | Facility: CLINIC | Age: 47
End: 2022-02-22
Payer: COMMERCIAL

## 2022-02-22 DIAGNOSIS — M54.50 LOW BACK PAIN, UNSPECIFIED BACK PAIN LATERALITY, UNSPECIFIED CHRONICITY, UNSPECIFIED WHETHER SCIATICA PRESENT: ICD-10-CM

## 2022-02-22 PROCEDURE — 97530 THERAPEUTIC ACTIVITIES: CPT | Mod: GP | Performed by: PHYSICAL THERAPIST

## 2022-02-22 PROCEDURE — 97140 MANUAL THERAPY 1/> REGIONS: CPT | Mod: GP | Performed by: PHYSICAL THERAPIST

## 2022-02-22 NOTE — PROGRESS NOTES
Problem: Pain  Goal: #Acceptable pain level achieved/maintained at rest using NRS/Faces  Description: This goal is used for patients who can self-report.  Acceptable means the level is at or below the identified comfort/function goal.  8/26/2021 0127 by Oluwadamilola M Olugbile, RN  Outcome: Outcome Met, Continue evaluating goal progress toward completion  8/25/2021 2117 by Oluwadamilola M Olugbile, RN  Outcome: Outcome Met, Continue evaluating goal progress toward completion  8/25/2021 2116 by Oluwadamilola M Olugbile, RN  Outcome: Outcome Met, Continue evaluating goal progress toward completion  Goal: Acceptable pain/comfort level is achieved/maintained at rest (based on Pain Behaviors Scale)  Description: This goal is used for patients who are not able to self-report pain and are assessed for pain using the Pain Behaviors Scale  8/26/2021 0127 by Oluwadamilola M Olugbile, RN  Outcome: Outcome Met, Continue evaluating goal progress toward completion  8/25/2021 2117 by Oluwadamilola M Olugbile, RN  Outcome: Outcome Met, Continue evaluating goal progress toward completion  8/25/2021 2116 by Oluwadamilola M Olugbile, RN  Outcome: Outcome Met, Continue evaluating goal progress toward completion  Goal: # Verbalizes understanding of pain management  Description: Documented in Patient Education Activity  8/26/2021 0127 by Oluwadamilola M Olugbile, RN  Outcome: Outcome Met, Continue evaluating goal progress toward completion  8/25/2021 2117 by Oluwadamilola M Olugbile, RN  Outcome: Outcome Met, Continue evaluating goal progress toward completion  8/25/2021 2116 by Oluwadamilola M Olugbile, RN  Outcome: Outcome Met, Continue evaluating goal progress toward completion  Goal: # Acceptable pain level achieved/maintained at rest using NRS/Faces without oversedation (opioid naive or PCA/Epidural infusion)  Description: This goal is used if Opioid-naïve or on PCA/Epidural Infusion.  8/26/2021 0127 by Oluwadamilola M Olugbile,  Subjective:  HPI  Physical Exam               Objective:  System    Physical Exam    General     ROS      PROGRESS  REPORT    2/22/2022    SUBJECTIVE  Subjective: Pt reports that back is still responding to extension. It has been difficult to do SL extension due to finding a place to do it in her home. She feels most of her pain in pelvic area both with intercourse and sitting for any amount of time. She did feel like initial PF assessment helped with her symptoms.     Changes in function:  Yes (See Goal flowsheet attached for changes in current functional level)  Adverse reaction to treatment or activity: None    OBJECTIVE  Oswestry Score: 8 %        Objective: Manual stretching to PF with reproduction of symtpoms with L Obturator internus palpation. Pt ed for home stretching program with  or independently.  Improved symptoms immediately after today's treatment.     ASSESSMENT/PLAN  Updated problem list and treatment plan: Diagnosis 1:  Low Back Pain   Pain -  education and home program  Decreased function - therapeutic activities and home program  STG/LTGs have been met or progress has been made towards goals:  Yes (See Goal flow sheet completed today.)  Assessment of Progress: The patient's condition is improving.  Self Management Plans:  Patient has been instructed in a home treatment program.  I have re-evaluated this patient and find that the nature, scope, duration and intensity of the therapy is appropriate for the medical condition of the patient.  Karishma continues to require the following intervention to meet STG and LTG's:  PT    Recommendations:  This patient would benefit from continued therapy.     Frequency:  1 X a month, once daily  Duration:  for 1 months        Please refer to the daily flowsheet for treatment today, total treatment time and time spent performing 1:1 timed codes.           RN  Outcome: Outcome Met, Continue evaluating goal progress toward completion  8/25/2021 2117 by Oluwadamilola M Olugbile, RN  Outcome: Outcome Met, Continue evaluating goal progress toward completion  Goal: # Acceptable pain level achieved/maintained with activity using NRS/Faces  Description: This goal is used for patients who can self-report and are not achieving acceptable pain control during activity.  8/26/2021 0127 by Oluwadamilola M Olugbile, RN  Outcome: Outcome Met, Continue evaluating goal progress toward completion  8/25/2021 2117 by Oluwadamilola M Olugbile, RN  Outcome: Outcome Met, Continue evaluating goal progress toward completion  Goal: Acceptable pain/comfort level is achieved/maintained at rest based on PAINAID scale (Dementia)  Description: This goal is used for patients who are not able to self-report pain, have dementia, and assessed using the PAINAD scale.  8/26/2021 0127 by Oluwadamilola M Olugbile, RN  Outcome: Outcome Met, Continue evaluating goal progress toward completion  8/25/2021 2117 by Oluwadamilola M Olugbile, RN  Outcome: Outcome Met, Continue evaluating goal progress toward completion  Goal: Acceptable pain/comfort level is achieved/maintained at rest (based on pediatric behavior tool: NIPS, NPASS, or FLACC)  Description: This goal is used for pediatric patients who are not able to self report pain.  8/26/2021 0127 by Oluwadamilola M Olugbile, RN  Outcome: Outcome Met, Continue evaluating goal progress toward completion  8/25/2021 2117 by Oluwadamilola M Olugbile, RN  Outcome: Outcome Met, Continue evaluating goal progress toward completion  Goal: Verbalizes understanding and effective use of Patient Controlled Analgesia (PCA)  Description: Documented in Patient Education Activity  This goal is used for patients with PCA  8/26/2021 0127 by Oluwadamilola M Olugbile, RN  Outcome: Outcome Met, Continue evaluating goal progress toward completion  8/25/2021 2117 by Oluwadamilola M  SHILO Garcia  Outcome: Outcome Met, Continue evaluating goal progress toward completion  Goal: Maximum comfort achieved/maintained at end of life (Hospice)  8/26/2021 0127 by Oluwadamilola M Olugbile, RN  Outcome: Outcome Met, Continue evaluating goal progress toward completion  8/25/2021 2117 by Oluwadamilola M Olugbile, RN  Outcome: Outcome Met, Continue evaluating goal progress toward completion     Problem: Artificial Airway Management  Goal: # Maintains effective artificial airway  8/26/2021 0127 by Oluwadamilola M Olugbile, RN  Outcome: Outcome Met, Continue evaluating goal progress toward completion  8/25/2021 2117 by Oluwadamilola M Olugbile, RN  Outcome: Outcome Met, Continue evaluating goal progress toward completion  8/25/2021 2116 by Oluwadamilola M Olugbile, RN  Outcome: Outcome Met, Continue evaluating goal progress toward completion  Goal: # Maintains skin integrity around the airway  8/26/2021 0127 by Oluwadamilola M Olugbile, RN  Outcome: Outcome Met, Continue evaluating goal progress toward completion  8/25/2021 2117 by Oluwadamilola M Olugbile, RN  Outcome: Outcome Met, Continue evaluating goal progress toward completion  8/25/2021 2116 by Oluwadamilola M Olugbile, RN  Outcome: Outcome Met, Continue evaluating goal progress toward completion  Goal: # Tolerates Activity/ADL without s/s of intolerance  Description: Monitor patient tolerance of the artificial airway while they perform activities and ADLs include bathing, showering, shaving, and eating.  8/26/2021 0127 by Oluwadamilola M Olugbile, RN  Outcome: Outcome Met, Continue evaluating goal progress toward completion  8/25/2021 2117 by Oluwadamilola M Olugbile, RN  Outcome: Outcome Met, Continue evaluating goal progress toward completion  8/25/2021 2116 by Oluwadamilola M Olugbile, RN  Outcome: Outcome Met, Continue evaluating goal progress toward completion     Problem: Cardiac Surgery  Goal: Hemodynamic stability  achieved/maintained  Description: Hemodynamic instability may include VS or rhythm changes or s/s FVE.  AHA guidelines: Keep BP>90 mm Hg.  8/26/2021 0127 by Oluwadamilola M Olugbile, RN  Outcome: Outcome Met, Continue evaluating goal progress toward completion  8/25/2021 2117 by Oluwadamilola M Olugbile, RN  Outcome: Outcome Met, Continue evaluating goal progress toward completion  8/25/2021 2116 by Oluwadamilola M Olugbile, RN  Outcome: Outcome Met, Continue evaluating goal progress toward completion  Goal: Extubation criteria met (Goal within 6 hours post-op)  Description: Choose this goal for Fast Track Recovery patients only  8/26/2021 0127 by Oluwadamilola M Olugbile, RN  Outcome: Outcome Met, Continue evaluating goal progress toward completion  8/25/2021 2117 by Oluwadamilola M Olugbile, RN  Outcome: Outcome Met, Continue evaluating goal progress toward completion  8/25/2021 2116 by Oluwadamilola M Olugbile, RN  Outcome: Outcome Met, Continue evaluating goal progress toward completion  Goal: Extubation criteria met (Goal within 24 hours post-op)  8/26/2021 0127 by Oluwadamilola M Olugbile, RN  Outcome: Outcome Met, Continue evaluating goal progress toward completion  8/25/2021 2117 by Oluwadamilola M Olugbile, RN  Outcome: Outcome Met, Continue evaluating goal progress toward completion  8/25/2021 2116 by Oluwadamilola M Olugbile, RN  Outcome: Outcome Met, Continue evaluating goal progress toward completion  Goal: Neurological status returned to baseline  8/26/2021 0127 by Oluwadamilola M Olugbile, RN  Outcome: Outcome Met, Continue evaluating goal progress toward completion  8/25/2021 2117 by Oluwadamilola M Olugbile, RN  Outcome: Outcome Met, Continue evaluating goal progress toward completion  8/25/2021 2116 by Oluwadamilola M Olugbile, RN  Outcome: Outcome Met, Continue evaluating goal progress toward completion  Goal: Elimination status is maintained/returned to baseline  8/26/2021 0127 by Oluwadamilola M  SHILO Garcia  Outcome: Outcome Met, Continue evaluating goal progress toward completion  8/25/2021 2117 by Oluwadamilola M Olugbile, RN  Outcome: Outcome Met, Continue evaluating goal progress toward completion  8/25/2021 2116 by Oluwadamilola M Olugbile, RN  Outcome: Outcome Met, Continue evaluating goal progress toward completion  Goal: Activity level is maintained/returned to level needed for d/c  8/26/2021 0127 by Oluwadamilola M Olugbile, RN  Outcome: Outcome Met, Continue evaluating goal progress toward completion  8/25/2021 2117 by Oluwadamilola M Olugbile, RN  Outcome: Outcome Met, Continue evaluating goal progress toward completion  8/25/2021 2116 by Oluwadamilola M Olugbile, RN  Outcome: Outcome Met, Continue evaluating goal progress toward completion  Goal: Verbalizes/demonstrates understanding of heart disease, risk factors, treatment plan, and d/c instructions  Description: Document on Patient Education Activity   8/26/2021 0127 by Oluwadamilola M Olugbile, RN  Outcome: Outcome Met, Continue evaluating goal progress toward completion  8/25/2021 2117 by Oluwadamilola M Olugbile, RN  Outcome: Outcome Met, Continue evaluating goal progress toward completion  8/25/2021 2116 by Oluwadamilola M Olugbile, RN  Outcome: Outcome Met, Continue evaluating goal progress toward completion     Problem: At Risk for Falls  Goal: # Patient does not fall  8/26/2021 0127 by Oluwadamilola M Olugbile, RN  Outcome: Outcome Met, Continue evaluating goal progress toward completion  8/25/2021 2117 by Oluwadamilola M Olugbile, RN  Outcome: Outcome Met, Continue evaluating goal progress toward completion  Goal: # Takes action to control fall-related risks  8/26/2021 0127 by Oluwadamilola M Olugbile, RN  Outcome: Outcome Met, Continue evaluating goal progress toward completion  8/25/2021 2117 by Oluwadamilola M Olugbile, RN  Outcome: Outcome Met, Continue evaluating goal progress toward completion  Goal: # Verbalizes understanding  of fall risk/precautions  Description: Document education using the patient education activity  8/26/2021 0127 by Oluwadamilola M Olugbile, RN  Outcome: Outcome Met, Continue evaluating goal progress toward completion  8/25/2021 2117 by Oluwadamilola M Olugbile, RN  Outcome: Outcome Met, Continue evaluating goal progress toward completion     Problem: At Risk for Injury Due to Fall  Goal: # Patient does not fall  8/26/2021 0127 by Oluwadamilola M Olugbile, RN  Outcome: Outcome Met, Continue evaluating goal progress toward completion  8/25/2021 2117 by Oluwadamilola M Olugbile, RN  Outcome: Outcome Met, Continue evaluating goal progress toward completion  Goal: # Takes action to control condition specific risks  8/26/2021 0127 by Oluwadamilola M Olugbile, RN  Outcome: Outcome Met, Continue evaluating goal progress toward completion  8/25/2021 2117 by Oluwadamilola M Olugbile, RN  Outcome: Outcome Met, Continue evaluating goal progress toward completion  Goal: # Verbalizes understanding of fall-related injury personal risks  Description: Document education using the patient education activity  8/26/2021 0127 by Oluwadamilola M Olugbile, RN  Outcome: Outcome Met, Continue evaluating goal progress toward completion  8/25/2021 2117 by Oluwadamilola M Olugbile, RN  Outcome: Outcome Met, Continue evaluating goal progress toward completion

## 2022-04-05 ENCOUNTER — E-VISIT (OUTPATIENT)
Dept: URGENT CARE | Facility: CLINIC | Age: 47
End: 2022-04-05
Payer: COMMERCIAL

## 2022-04-05 DIAGNOSIS — Z20.822 ENCOUNTER FOR LABORATORY TESTING FOR COVID-19 VIRUS: Primary | ICD-10-CM

## 2022-04-05 PROCEDURE — 99421 OL DIG E/M SVC 5-10 MIN: CPT | Performed by: PHYSICIAN ASSISTANT

## 2022-04-06 NOTE — PATIENT INSTRUCTIONS
Dear Karishma Saucedo,    Based on your responses, we have ordered COVID-19 (coronavirus) testing for you. Testing is recommended for people without symptoms in a number of different situations. These reasons include testing prior to air travel, testing after international travel, periodic testing for people who are attending in-person school, and testing related to participation in activities such as sports.     How to schedule:  Go to your [employer] home page and scroll down to the section that says  You have an appointment that needs to be scheduled  and click the large green button that says  Schedule Now  and follow the steps to find the next available opening.     If you are unable to complete these [employer] scheduling steps, please call [phone] to schedule your testing.      Know the test result takes usually 1-2 days to return but occasionally takes longer (over 95% are done within 72 hours).The results are available on [employer] right when the lab is completed. We will also call all people who have positive results.    What are the symptoms of COVID-19?  The most common symptoms are cough, fever and trouble breathing. Less common symptoms include headache, body aches, fatigue (feeling very tired), chills, sore throat, stuffy or runny nose, diarrhea (loose poop), loss of taste or smell, belly pain, and nausea or vomiting (feeling sick to your stomach or throwing up).    If you develop symptoms of COVID-19, you should be re-evaluated to see if retesting would be recommended.     Where can I get more information?  [facility] - About COVID-19: [email or web address]  CDC - What to Do If You're Sick: www.cdc.gov/coronavirus/2019-ncov/about/steps-when-sick.html  CDC - Ending Home Isolation: www.cdc.gov/coronavirus/2019-ncov/hcp/disposition-in-home-patients.html  CDC - Caring for Someone: www.cdc.gov/coronavirus/2019-ncov/if-you-are-sick/care-for-someone.html  [facility]  trials (COVID-19 research studies): clinicalaffairs.[facility]/n-clinical-trials  Below are the COVID-19 hotlines at the [place] [facility] ([facility]). Interpreters are available.  For health questions: Call 822-216-1343 or 1-621.674.7564 (7 a.m. to 7 p.m.)  For questions about schools and childcare: Call 008-036-8680 or 1-769.584.6824 (7 a.m. to 7 p.m.)

## 2022-04-16 ENCOUNTER — LAB (OUTPATIENT)
Dept: LAB | Facility: CLINIC | Age: 47
End: 2022-04-16
Attending: PHYSICIAN ASSISTANT
Payer: COMMERCIAL

## 2022-04-16 DIAGNOSIS — Z20.822 ENCOUNTER FOR LABORATORY TESTING FOR COVID-19 VIRUS: ICD-10-CM

## 2022-04-16 LAB — SARS-COV-2 RNA RESP QL NAA+PROBE: NEGATIVE

## 2022-04-16 PROCEDURE — U0005 INFEC AGEN DETEC AMPLI PROBE: HCPCS

## 2022-04-29 ENCOUNTER — MYC MEDICAL ADVICE (OUTPATIENT)
Dept: FAMILY MEDICINE | Facility: CLINIC | Age: 47
End: 2022-04-29
Payer: COMMERCIAL

## 2022-05-05 ENCOUNTER — OFFICE VISIT (OUTPATIENT)
Dept: FAMILY MEDICINE | Facility: CLINIC | Age: 47
End: 2022-05-05
Payer: COMMERCIAL

## 2022-05-05 VITALS — SYSTOLIC BLOOD PRESSURE: 112 MMHG | DIASTOLIC BLOOD PRESSURE: 64 MMHG

## 2022-05-05 DIAGNOSIS — L21.9 SEBORRHEIC DERMATITIS: Primary | ICD-10-CM

## 2022-05-05 DIAGNOSIS — L29.9 LOCALIZED PRURITUS: ICD-10-CM

## 2022-05-05 DIAGNOSIS — L65.9 NON-SCARRING ALOPECIA: ICD-10-CM

## 2022-05-05 LAB
ERYTHROCYTE [DISTWIDTH] IN BLOOD BY AUTOMATED COUNT: 13.4 % (ref 10–15)
HCT VFR BLD AUTO: 41.7 % (ref 35–47)
HGB BLD-MCNC: 13.6 G/DL (ref 11.7–15.7)
MCH RBC QN AUTO: 28.8 PG (ref 26.5–33)
MCHC RBC AUTO-ENTMCNC: 32.6 G/DL (ref 31.5–36.5)
MCV RBC AUTO: 88 FL (ref 78–100)
PLATELET # BLD AUTO: 369 10E3/UL (ref 150–450)
RBC # BLD AUTO: 4.72 10E6/UL (ref 3.8–5.2)
WBC # BLD AUTO: 10.3 10E3/UL (ref 4–11)

## 2022-05-05 PROCEDURE — 85027 COMPLETE CBC AUTOMATED: CPT | Performed by: PHYSICIAN ASSISTANT

## 2022-05-05 PROCEDURE — 84630 ASSAY OF ZINC: CPT | Mod: 90 | Performed by: PHYSICIAN ASSISTANT

## 2022-05-05 PROCEDURE — 83550 IRON BINDING TEST: CPT | Performed by: PHYSICIAN ASSISTANT

## 2022-05-05 PROCEDURE — 82627 DEHYDROEPIANDROSTERONE: CPT | Performed by: PHYSICIAN ASSISTANT

## 2022-05-05 PROCEDURE — 82306 VITAMIN D 25 HYDROXY: CPT | Performed by: PHYSICIAN ASSISTANT

## 2022-05-05 PROCEDURE — 84425 ASSAY OF VITAMIN B-1: CPT | Mod: 90 | Performed by: PHYSICIAN ASSISTANT

## 2022-05-05 PROCEDURE — 82728 ASSAY OF FERRITIN: CPT | Performed by: PHYSICIAN ASSISTANT

## 2022-05-05 PROCEDURE — 99000 SPECIMEN HANDLING OFFICE-LAB: CPT | Performed by: PHYSICIAN ASSISTANT

## 2022-05-05 PROCEDURE — 36415 COLL VENOUS BLD VENIPUNCTURE: CPT | Performed by: PHYSICIAN ASSISTANT

## 2022-05-05 PROCEDURE — 84270 ASSAY OF SEX HORMONE GLOBUL: CPT | Performed by: PHYSICIAN ASSISTANT

## 2022-05-05 PROCEDURE — 99214 OFFICE O/P EST MOD 30 MIN: CPT | Performed by: PHYSICIAN ASSISTANT

## 2022-05-05 PROCEDURE — 84155 ASSAY OF PROTEIN SERUM: CPT | Performed by: PHYSICIAN ASSISTANT

## 2022-05-05 PROCEDURE — 86431 RHEUMATOID FACTOR QUANT: CPT | Performed by: PHYSICIAN ASSISTANT

## 2022-05-05 PROCEDURE — 84403 ASSAY OF TOTAL TESTOSTERONE: CPT | Performed by: PHYSICIAN ASSISTANT

## 2022-05-05 PROCEDURE — 86038 ANTINUCLEAR ANTIBODIES: CPT | Performed by: PHYSICIAN ASSISTANT

## 2022-05-05 RX ORDER — FLUOCINONIDE TOPICAL SOLUTION USP, 0.05% 0.5 MG/ML
SOLUTION TOPICAL
Qty: 60 ML | Refills: 2 | Status: SHIPPED | OUTPATIENT
Start: 2022-05-05

## 2022-05-05 NOTE — PROGRESS NOTES
[facility]  Encounter Date: May 5, 2022  Office Visit   ____________________________________________    Assessment & Plan:    1. 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 Given the dryness and irritation, recommended that she start Head & Shoulders, T-sal, T-gel, or other antifungal shampoo. Patient has history of autoimmune hypothyroidism and is following with an outside provider; advised she continue maintaining this care. Discussed other options, including topical minoxidil, oral finasteride, spironolactone, LLLT, and PRP.   - Start minoxidil 5% foam 1-2x daily  - Start Lidex 0.05% solution bid x2-4 weeks, tapering with improvement.  - start head and shoulders 1-2x a week  - Labs ordered: CBC, MEHDI, DHEA-S, Ferritin,Iron and iron binding capacity, total protein, total and free T,  vit B1, vit D, zinc, thyroid peroxidase antibody, RF      Reviewed pertinent charts and labs prior to office visit.     Follow-up: prn for new or changing lesions. [place] out of [place]      Provider Disclosure:   The documentation recorded by the scribe accurately reflects the services I personally performed and the decisions made by me.    Georgina Cleveland, MS, [place]      Scribe Disclosure:  I, Bianca Streeter, am serving as a scribe to document services personally performed by Georgina Cleveland PA-C based on data collection and the provider's statements to me.   ____________________________________________________    HPI:  Ms. Karishma Saucedo is a(n) 47 year old female who presents today as a new patient for hair loss. Patient states this has been present for many years, worsening significantly in the last few years. She started seeing a specialist for her thyroid about 2 years ago and has gotten her thyroid levels stabilized, though has  not noticed any improvement in the hair loss. Patient reports the following symptoms: shedding, scalp dryness, scalp itching. Patient reports the following previous treatments: none. She has changed shampoo and found less itching, but otherwise no change.  Patient reports the following modifying factors: none. Associated symptoms: none.  Patient has no other skin complaints today. Remainder of the HPI, Meds, PMH, Allergies, FH, and SH was reviewed in chart.       Pertinent findings: History of autoimmune hypothyroidism. Reports family history of hair loss.    Medications:  Current Outpatient Medications   Medication     budesonide-formoterol (SYMBICORT) 160-4.5 MCG/ACT Inhaler     Cholecalciferol (VITAMIN D) 2000 UNITS CAPS     fexofenadine (ALLEGRA) 180 MG tablet     hydrOXYzine (ATARAX) 25 MG tablet     letrozole (FEMARA) 2.5 MG tablet     norelgestromin-ethinyl estradiol (ORTHO EVRA) 150-35 MCG/24HR patch     SYNTHROID 75 MCG tablet     vitamin B complex with vitamin C (STRESS TAB) tablet     cyclobenzaprine (FLEXERIL) 5 MG tablet     No current facility-administered medications for this visit.        Past Medical History:   Patient Active Problem List   Diagnosis     Seasonal allergies     Hypothyroidism, Dx 2004     Hair loss     Contraception     Thyrotoxicosis, exogenous iatrogenic     Endometriosis     Effusion of lower leg joint     Chronic urticaria     Low back pain, unspecified back pain laterality, unspecified chronicity, unspecified whether sciatica present     Past Medical History:   Diagnosis Date     Autoimmune hypothyroidism 2005     Dysmenorrhea      Endometriosis Na       Past Surgical History:   Past Surgical History:   Procedure Laterality Date     NO HISTORY OF SURGERY         Family History:  Family History   Problem Relation Age of Onset     Thyroid Disease Maternal Uncle      Diabetes Mother      Hypertension Maternal Uncle      Diabetes Father      Thyroid Disease Father      Other Cancer  Father         glioblastoma, had surgery, 77 yo     Parkinsonism Father      Thyroid Disease Other      Hyperlipidemia Other      Cancer No family hx of        Social History:  Social History     Tobacco Use     Smoking status: Never Smoker     Smokeless tobacco: Never Used   Substance Use Topics     Alcohol use: Yes     Alcohol/week: 5.0 standard drinks     Comment: 1-2 drinks 3 days/week          Review Of Systems:  Skin: hair loss  Eyes: negative  Ears/Nose/Throat: negative  Respiratory: No shortness of breath, dyspnea on exertion, cough, or hemoptysis  Cardiovascular: negative  Gastrointestinal: negative  Genitourinary: negative  Musculoskeletal: negative  Neurologic: negative  Psychiatric: negative  Hematologic/Lymphatic/Immunologic: negative  Endocrine: negative      Objective:     /64   Eyes: Conjunctivae/lids: Normal   ENT: Lips:  Normal  MSK: Normal  Cardiovascular: Peripheral edema none  Pulm: Breathing Normal  Neuro/Psych: Orientation: Normal; Mood/Affect: Normal, NAD, WDWN  Pt accompanied by: self  Following areas examined: face, neck, scalp. Pt wearing mask.   [place] skin type: IV   Findings:  nml appearing scalp, no erythema, atrophy, scarring. [place] terminal hairs at frontotemporal scaclp

## 2022-05-05 NOTE — LETTER
5/5/2022         RE: Karishma Saucedo  [address]  [place] [address]        Dear Colleague,    Thank you for referring your patient, Karishma Saucedo, to the [facility]. Please see a copy of my visit note below.    [facility]  Encounter Date: May 5, 2022  Office Visit   ____________________________________________    Assessment & Plan:    1. 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 Given the dryness and irritation, recommended that she start Head & Shoulders, T-sal, T-gel, or other antifungal shampoo. Patient has history of autoimmune hypothyroidism and is following with an outside provider; advised she continue maintaining this care. Discussed other options, including topical minoxidil, oral finasteride, spironolactone, LLLT, and PRP.   - Start minoxidil 5% foam 1-2x daily  - Start Lidex 0.05% solution bid x2-4 weeks, tapering with improvement.  - start head and shoulders 1-2x a week  - Labs ordered: CBC, MEHDI, DHEA-S, Ferritin,Iron and iron binding capacity, total protein, total and free T,  vit B1, vit D, zinc, thyroid peroxidase antibody, RF      Reviewed pertinent charts and labs prior to office visit.     Follow-up: prn for new or changing lesions. [place] moving out of state      Provider Disclosure:   The documentation recorded by the scribe accurately reflects the services I personally performed and the decisions made by me.    Georgina Cleveland, MS, PAANNE MARIE      Scribe Disclosure:  I, Bianca Streeter, am serving as a scribe to document services personally performed by Georgina Cleveland PA-C based on data collection and the provider's statements to me.   ____________________________________________________    HPI:  Ms. Karishma Saucedo is a(n) 47 year old female who  presents today as a new patient for hair loss. Patient states this has been present for many years, worsening significantly in the last few years. She started seeing a specialist for her thyroid about 2 years ago and has gotten her thyroid levels stabilized, though has not noticed any improvement in the hair loss. Patient reports the following symptoms: shedding, scalp dryness, scalp itching. Patient reports the following previous treatments: none. She has changed shampoo and found less itching, but otherwise no change.  Patient reports the following modifying factors: none. Associated symptoms: none.  Patient has no other skin complaints today. Remainder of the HPI, Meds, PMH, Allergies, FH, and SH was reviewed in chart.       Pertinent findings: History of autoimmune hypothyroidism. Reports family history of hair loss.    Medications:  Current Outpatient Medications   Medication     budesonide-formoterol (SYMBICORT) 160-4.5 MCG/ACT Inhaler     Cholecalciferol (VITAMIN D) 2000 UNITS CAPS     fexofenadine (ALLEGRA) 180 MG tablet     hydrOXYzine (ATARAX) 25 MG tablet     letrozole (FEMARA) 2.5 MG tablet     norelgestromin-ethinyl estradiol (ORTHO EVRA) 150-35 MCG/24HR patch     SYNTHROID 75 MCG tablet     vitamin B complex with vitamin C (STRESS TAB) tablet     cyclobenzaprine (FLEXERIL) 5 MG tablet     No current facility-administered medications for this visit.        Past Medical History:   Patient Active Problem List   Diagnosis     Seasonal allergies     Hypothyroidism, Dx 2004     Hair loss     Contraception     Thyrotoxicosis, exogenous iatrogenic     Endometriosis     Effusion of lower leg joint     Chronic urticaria     Low back pain, unspecified back pain laterality, unspecified chronicity, unspecified whether sciatica present     Past Medical History:   Diagnosis Date     Autoimmune hypothyroidism 2005     Dysmenorrhea      Endometriosis Na       Past Surgical History:   Past Surgical History:   Procedure  Laterality Date     NO HISTORY OF SURGERY         Family History:  Family History   Problem Relation Age of Onset     Thyroid Disease Maternal Uncle      Diabetes Mother      Hypertension Maternal Uncle      Diabetes Father      Thyroid Disease Father      Other Cancer Father         glioblastoma, had surgery, 75 yo     Parkinsonism Father      Thyroid Disease Other      Hyperlipidemia Other      Cancer No family hx of        Social History:  Social History     Tobacco Use     Smoking status: Never Smoker     Smokeless tobacco: Never Used   Substance Use Topics     Alcohol use: Yes     Alcohol/week: 5.0 standard drinks     Comment: 1-2 drinks 3 days/week          Review Of Systems:  Skin: hair loss  Eyes: negative  Ears/Nose/Throat: negative  Respiratory: No shortness of breath, dyspnea on exertion, cough, or hemoptysis  Cardiovascular: negative  Gastrointestinal: negative  Genitourinary: negative  Musculoskeletal: negative  Neurologic: negative  Psychiatric: negative  Hematologic/Lymphatic/Immunologic: negative  Endocrine: negative      Objective:     /64   Eyes: Conjunctivae/lids: Normal   ENT: Lips:  Normal  MSK: Normal  Cardiovascular: Peripheral edema none  Pulm: Breathing Normal  Neuro/Psych: Orientation: Normal; Mood/Affect: Normal, NAD, WDWN  Pt accompanied by: self  Following areas examined: face, neck, scalp. Pt wearing mask.   [place] skin type: IV   Findings:  nml appearing scalp, no erythema, atrophy, scarring. [place] terminal hairs at frontotemporal scaclp             Again, thank you for allowing me to participate in the care of your patient.        Sincerely,        Georgina Cleveland PA-C

## 2022-05-05 NOTE — PATIENT INSTRUCTIONS
Female pattern alopecia or androgenetic alopecia is mediated by dihydrotestosterone causing miniaturization of the hair follicles. Treatment options to prevent further hair loss are Finasteride, Spironolactone, and Minoxidil. Treatment will not cause hair to regrow. Once treatment is discontinued hair loss will progress.    Side effects of oral Finasteride include and are not limited to impotence, decreased libido, abnormal ejaculation, sexual dysfunction and gynecomastia.    Side effects of Minoxidil (Rogaine) include contact dermatitis, pruritis (itch), and skin irritation. Recommend using men's Rogaine once a day.     Side effects of Spironolactone: This oral medication blocks androgens (hormones that can aggravate acne).  Since this medication is also used as a diuretic, baseline blood work is needed to check your electrolytes and kidney function. Do not get pregnant while on this medication. Drink plenty of water to avoid dehydration. May feel lightheaded from medication and may increase urination.     Hair max hand held wanurszula  [employer]  Labs today  Platelet rich plasma  [email or web address]

## 2022-05-06 LAB
ANA SER QL IF: NEGATIVE
DEPRECATED CALCIDIOL+CALCIFEROL SERPL-MC: 65 UG/L (ref 20–75)
DHEA-S SERPL-MCNC: 81 UG/DL (ref 35–430)
FERRITIN SERPL-MCNC: 52 NG/ML (ref 8–252)
IRON SATN MFR SERPL: 21 % (ref 15–46)
IRON SERPL-MCNC: 97 UG/DL (ref 35–180)
PROT SERPL-MCNC: 7.7 G/DL (ref 6.8–8.8)
RHEUMATOID FACT SER NEPH-ACNC: <6 IU/ML
SHBG SERPL-SCNC: 160 NMOL/L (ref 30–135)
TIBC SERPL-MCNC: 454 UG/DL (ref 240–430)

## 2022-05-07 LAB
TESTOST FREE SERPL-MCNC: 0.06 NG/DL
TESTOST SERPL-MCNC: 11 NG/DL (ref 8–60)

## 2022-05-09 DIAGNOSIS — E60 LOW ZINC IN BREAST MILK: Primary | ICD-10-CM

## 2022-05-09 DIAGNOSIS — R89.2 LOW ZINC IN BREAST MILK: Primary | ICD-10-CM

## 2022-05-09 DIAGNOSIS — E60 LOW ZINC LEVEL: ICD-10-CM

## 2022-05-09 DIAGNOSIS — R79.0 LOW FERRITIN: ICD-10-CM

## 2022-05-09 LAB
VIT B1 PYROPHOSHATE BLD-SCNC: 182 NMOL/L
ZINC SERPL-MCNC: 51.5 UG/DL

## 2022-05-09 RX ORDER — FERROUS SULFATE 325(65) MG
325 TABLET ORAL
Qty: 90 TABLET | Refills: 0 | Status: SHIPPED | OUTPATIENT
Start: 2022-05-09

## 2022-05-09 RX ORDER — VIT C/HESPERIDIN/BIOFLAVONOIDS 500-100 MG
TABLET ORAL
Qty: 60 TABLET | Refills: 1 | Status: SHIPPED | OUTPATIENT
Start: 2022-05-09

## 2022-05-10 ENCOUNTER — TELEPHONE (OUTPATIENT)
Dept: FAMILY MEDICINE | Facility: CLINIC | Age: 47
End: 2022-05-10
Payer: COMMERCIAL

## 2022-05-10 NOTE — LETTER
May 10, 2022    Karishma Saucedo  [address]  [place] [facility] [address]        Dear Karishma,    This is a letter regarding your completed lab results. The tested values are below and were unremarkable.    Office Visit on 05/05/2022   Component Date Value Ref Range Status     MEHDI interpretation 05/05/2022 Negative  Negative Final     WBC Count 05/05/2022 10.3  4.0 - 11.0 10e3/uL Final     RBC Count 05/05/2022 4.72  3.80 - 5.20 10e6/uL Final     Hemoglobin 05/05/2022 13.6  11.7 - 15.7 g/dL Final     Hematocrit 05/05/2022 41.7  35.0 - 47.0 % Final     MCV 05/05/2022 88  78 - 100 fL Final     MCH 05/05/2022 28.8  26.5 - 33.0 pg Final     MCHC 05/05/2022 32.6  31.5 - 36.5 g/dL Final     RDW 05/05/2022 13.4  10.0 - 15.0 % Final     Platelet Count 05/05/2022 369  150 - 450 10e3/uL Final     DHEA Sulfate 05/05/2022 81  35 - 430 ug/dL Final     Ferritin 05/05/2022 52  8 - 252 ng/mL Final     Iron 05/05/2022 97  35 - 180 ug/dL Final     Iron Binding Capacity 05/05/2022 454 (A) 240 - 430 ug/dL Final     Iron Sat Index 05/05/2022 21  15 - 46 % Final     Protein Total 05/05/2022 7.7  6.8 - 8.8 g/dL Final     Rheumatoid Factor 05/05/2022 <6  <12 IU/mL Final     Vitamin B1 Whole Blood Level 05/05/2022 182 (A) 70 - 180 nmol/L Final     Vitamin D, Total (25-Hydroxy) 05/05/2022 65  20 - 75 ug/L Final     Zinc, Serum/Plasma 05/05/2022 51.5 (A) 60.0 - 120.0 ug/dL Final     Sex Hormone Binding Globulin 05/05/2022 160 (A) 30 - 135 nmol/L Final     Free Testosterone Calculated 05/05/2022 0.06  ng/dL Final     Testosterone Total 05/05/2022 11  8 - 60 ng/dL Final         Thank you for allowing me to be involved in your health care and for choosing [facility].  If you have any questions or concerns please feel free to contact me at [phone]      Sincerely,      Kassidy Cleveland PA-C

## 2022-06-15 ENCOUNTER — ANCILLARY PROCEDURE (OUTPATIENT)
Dept: MAMMOGRAPHY | Facility: CLINIC | Age: 47
End: 2022-06-15
Attending: OBSTETRICS & GYNECOLOGY
Payer: COMMERCIAL

## 2022-06-15 DIAGNOSIS — Z12.31 VISIT FOR SCREENING MAMMOGRAM: ICD-10-CM

## 2022-06-15 PROCEDURE — 77067 SCR MAMMO BI INCL CAD: CPT | Mod: GC

## 2022-06-15 PROCEDURE — 77063 BREAST TOMOSYNTHESIS BI: CPT | Mod: GC

## 2022-09-10 ENCOUNTER — HEALTH MAINTENANCE LETTER (OUTPATIENT)
Age: 47
End: 2022-09-10

## 2022-12-12 DIAGNOSIS — N80.9 ENDOMETRIOSIS: ICD-10-CM

## 2022-12-12 DIAGNOSIS — Z30.9 CONTRACEPTIVE MANAGEMENT: Primary | ICD-10-CM

## 2022-12-12 DIAGNOSIS — Z30.431 SURVEILLANCE OF INTRAUTERINE CONTRACEPTIVE DEVICE: ICD-10-CM

## 2022-12-12 RX ORDER — NORELGESTROMIN AND ETHINYL ESTRADIOL 35; 150 UG/MG; UG/MG
PATCH TRANSDERMAL
Qty: 3 PATCH | Refills: 3 | Status: CANCELLED | OUTPATIENT
Start: 2022-12-12

## 2022-12-14 RX ORDER — NORELGESTROMIN AND ETHINYL ESTRADIOL 35; 150 UG/MG; UG/MG
PATCH TRANSDERMAL
Qty: 16 PATCH | Refills: 3 | Status: SHIPPED | OUTPATIENT
Start: 2022-12-14

## 2022-12-23 PROBLEM — M54.50 LOW BACK PAIN, UNSPECIFIED BACK PAIN LATERALITY, UNSPECIFIED CHRONICITY, UNSPECIFIED WHETHER SCIATICA PRESENT: Status: RESOLVED | Noted: 2021-08-02 | Resolved: 2022-12-23

## 2023-04-30 ENCOUNTER — HEALTH MAINTENANCE LETTER (OUTPATIENT)
Age: 48
End: 2023-04-30

## 2023-10-01 ENCOUNTER — HEALTH MAINTENANCE LETTER (OUTPATIENT)
Age: 48
End: 2023-10-01

## 2024-07-07 ENCOUNTER — HEALTH MAINTENANCE LETTER (OUTPATIENT)
Age: 49
End: 2024-07-07

## 2024-09-15 ENCOUNTER — HEALTH MAINTENANCE LETTER (OUTPATIENT)
Age: 49
End: 2024-09-15